# Patient Record
Sex: FEMALE | Race: WHITE | NOT HISPANIC OR LATINO | Employment: OTHER | URBAN - METROPOLITAN AREA
[De-identification: names, ages, dates, MRNs, and addresses within clinical notes are randomized per-mention and may not be internally consistent; named-entity substitution may affect disease eponyms.]

---

## 2017-07-11 ENCOUNTER — OFFICE VISIT (OUTPATIENT)
Dept: OPTOMETRY | Facility: CLINIC | Age: 62
End: 2017-07-11
Payer: MEDICAID

## 2017-07-11 DIAGNOSIS — H52.03 HYPEROPIA WITH PRESBYOPIA, BILATERAL: ICD-10-CM

## 2017-07-11 DIAGNOSIS — E11.9 DIABETES MELLITUS TYPE 2 WITHOUT RETINOPATHY: Primary | ICD-10-CM

## 2017-07-11 DIAGNOSIS — H52.4 HYPEROPIA WITH PRESBYOPIA, BILATERAL: ICD-10-CM

## 2017-07-11 DIAGNOSIS — H25.13 NUCLEAR SCLEROSIS, BILATERAL: ICD-10-CM

## 2017-07-11 DIAGNOSIS — Z13.5 GLAUCOMA SCREENING: ICD-10-CM

## 2017-07-11 PROCEDURE — 99212 OFFICE O/P EST SF 10 MIN: CPT | Mod: PBBFAC,PO | Performed by: OPTOMETRIST

## 2017-07-11 PROCEDURE — 92015 DETERMINE REFRACTIVE STATE: CPT | Mod: ,,, | Performed by: OPTOMETRIST

## 2017-07-11 PROCEDURE — 99999 PR PBB SHADOW E&M-EST. PATIENT-LVL II: CPT | Mod: PBBFAC,,, | Performed by: OPTOMETRIST

## 2017-07-11 PROCEDURE — 92014 COMPRE OPH EXAM EST PT 1/>: CPT | Mod: S$PBB,,, | Performed by: OPTOMETRIST

## 2017-07-11 NOTE — PROGRESS NOTES
HPI     DSL- 7/6/16     Pt states no Va change. Pt  occas has eye dryness/allergies.   Glare is a bother. Pt denies floaters and flashes.   BSL was 221 this morning.   Pt did not last MRX filled.    Eyemeds  Systane OU PRN    No results found for: HGBA1C      Last edited by Aquilino Lau, OD on 7/11/2017 10:50 AM. (History)            Assessment /Plan     For exam results, see Encounter Report.    Diabetes mellitus type 2 without retinopathy  -No retinopathy noted today.  Continued control with primary care physician and annual comprehensive eye exam.    Nuclear sclerosis, bilateral  -Educated patient on presence of cataracts at today's exam, monitor at annual dilated fundus exam. 5+ years surgical estimate.    Glaucoma screening  -Monitor with annual eye exam and IOP check    Hyperopia with presbyopia, bilateral  Eyeglass Final Rx     Eyeglass Final Rx       Sphere Cylinder Axis Dist VA Add    Right +1.50 +0.75 155 20/20- +2.50    Left +1.25 +1.25 180 20/20- +2.50    Type:  PAL    Expiration Date:  7/12/2018                  RTC 1 yr

## 2018-12-19 DIAGNOSIS — Z13.820 SCREENING FOR OSTEOPOROSIS: Primary | ICD-10-CM

## 2019-01-02 ENCOUNTER — HOSPITAL ENCOUNTER (OUTPATIENT)
Dept: RADIOLOGY | Facility: HOSPITAL | Age: 64
Discharge: HOME | End: 2019-01-02
Attending: FAMILY MEDICINE
Payer: MEDICAID

## 2019-01-02 DIAGNOSIS — Z13.820 SCREENING FOR OSTEOPOROSIS: ICD-10-CM

## 2019-01-02 PROCEDURE — 77080 DXA BONE DENSITY AXIAL: CPT | Mod: 26,,, | Performed by: RADIOLOGY

## 2019-01-02 PROCEDURE — 77080 DEXA BONE DENSITY SPINE HIP: ICD-10-PCS | Mod: 26,,, | Performed by: RADIOLOGY

## 2019-01-02 PROCEDURE — 77080 DXA BONE DENSITY AXIAL: CPT | Mod: TC

## 2019-01-10 DIAGNOSIS — R74.8 ABNORMAL LEVELS OF OTHER SERUM ENZYMES: Primary | ICD-10-CM

## 2019-01-21 ENCOUNTER — HOSPITAL ENCOUNTER (OUTPATIENT)
Dept: RADIOLOGY | Facility: HOSPITAL | Age: 64
Discharge: HOME | End: 2019-01-21
Payer: MEDICAID

## 2019-01-21 DIAGNOSIS — R74.8 ABNORMAL LEVELS OF OTHER SERUM ENZYMES: ICD-10-CM

## 2019-01-21 PROCEDURE — 76705 ECHO EXAM OF ABDOMEN: CPT | Mod: 26,,, | Performed by: RADIOLOGY

## 2019-01-21 PROCEDURE — 76705 US ABDOMEN LIMITED: ICD-10-PCS | Mod: 26,,, | Performed by: RADIOLOGY

## 2019-01-21 PROCEDURE — 76705 ECHO EXAM OF ABDOMEN: CPT | Mod: TC

## 2019-02-27 ENCOUNTER — LAB VISIT (OUTPATIENT)
Dept: LAB | Facility: HOSPITAL | Age: 64
End: 2019-02-27
Attending: FAMILY MEDICINE
Payer: MEDICAID

## 2019-02-27 DIAGNOSIS — R74.8 ACID PHOSPHATASE ELEVATED: Primary | ICD-10-CM

## 2019-02-27 PROCEDURE — 87902 NFCT AGT GNTYP ALYS HEP C: CPT

## 2019-02-27 PROCEDURE — 87522 HEPATITIS C REVRS TRNSCRPJ: CPT

## 2019-02-27 PROCEDURE — 80177 DRUG SCRN QUAN LEVETIRACETAM: CPT

## 2019-02-27 PROCEDURE — 36415 COLL VENOUS BLD VENIPUNCTURE: CPT

## 2019-02-28 LAB — LEVETIRACETAM SERPL-MCNC: 26.3 UG/ML (ref 3–60)

## 2019-03-01 LAB
HCV GENTYP SERPL NAA+PROBE: ABNORMAL
HCV RNA SERPL NAA+PROBE-LOG IU: 6.41 LOG (10) IU/ML
HCV RNA SERPL QL NAA+PROBE: DETECTED
HCV RNA SPEC NAA+PROBE-ACNC: ABNORMAL IU/ML

## 2019-03-06 DIAGNOSIS — Z12.39 SCREENING BREAST EXAMINATION: Primary | ICD-10-CM

## 2019-03-14 ENCOUNTER — HOSPITAL ENCOUNTER (OUTPATIENT)
Dept: RADIOLOGY | Facility: HOSPITAL | Age: 64
Discharge: HOME | End: 2019-03-14
Payer: MEDICAID

## 2019-03-14 VITALS — HEIGHT: 59 IN | WEIGHT: 124 LBS | BODY MASS INDEX: 25 KG/M2

## 2019-03-14 DIAGNOSIS — Z12.39 SCREENING BREAST EXAMINATION: ICD-10-CM

## 2019-03-14 PROCEDURE — 77067 MAMMO DIGITAL SCREENING BILAT WITH TOMOSYNTHESIS_CAD: ICD-10-PCS | Mod: 26,,, | Performed by: RADIOLOGY

## 2019-03-14 PROCEDURE — 77067 SCR MAMMO BI INCL CAD: CPT | Mod: 26,,, | Performed by: RADIOLOGY

## 2019-03-14 PROCEDURE — 77063 BREAST TOMOSYNTHESIS BI: CPT | Mod: 26,,, | Performed by: RADIOLOGY

## 2019-03-14 PROCEDURE — 77063 MAMMO DIGITAL SCREENING BILAT WITH TOMOSYNTHESIS_CAD: ICD-10-PCS | Mod: 26,,, | Performed by: RADIOLOGY

## 2019-03-14 PROCEDURE — 77067 SCR MAMMO BI INCL CAD: CPT | Mod: TC

## 2019-05-27 ENCOUNTER — HOSPITAL ENCOUNTER (INPATIENT)
Facility: HOSPITAL | Age: 64
LOS: 6 days | Discharge: HOME HEALTH | DRG: 432 | End: 2019-06-02
Attending: EMERGENCY MEDICINE | Admitting: EMERGENCY MEDICINE
Payer: MEDICAID

## 2019-05-27 DIAGNOSIS — K92.2 ACUTE UPPER GI BLEEDING: ICD-10-CM

## 2019-05-27 DIAGNOSIS — M79.89 LEG SWELLING: ICD-10-CM

## 2019-05-27 DIAGNOSIS — B19.20 HEPATITIS C VIRUS INFECTION WITHOUT HEPATIC COMA, UNSPECIFIED CHRONICITY: ICD-10-CM

## 2019-05-27 DIAGNOSIS — K92.2 GASTROINTESTINAL HEMORRHAGE, UNSPECIFIED GASTROINTESTINAL HEMORRHAGE TYPE: Primary | ICD-10-CM

## 2019-05-27 DIAGNOSIS — R18.8 OTHER ASCITES: ICD-10-CM

## 2019-05-27 LAB
ABO + RH BLD: NORMAL
ALBUMIN SERPL BCP-MCNC: 2.1 G/DL (ref 3.5–5.2)
ALP SERPL-CCNC: 201 U/L (ref 55–135)
ALT SERPL W/O P-5'-P-CCNC: 13 U/L (ref 10–44)
ANION GAP SERPL CALC-SCNC: 11 MMOL/L (ref 8–16)
ANISOCYTOSIS BLD QL SMEAR: ABNORMAL
APTT BLDCRRT: 33.8 SEC (ref 21–32)
AST SERPL-CCNC: 43 U/L (ref 10–40)
BACTERIA #/AREA URNS HPF: NORMAL /HPF
BASOPHILS # BLD AUTO: 0.01 K/UL (ref 0–0.2)
BASOPHILS NFR BLD: 0.2 % (ref 0–1.9)
BILIRUB SERPL-MCNC: 0.7 MG/DL (ref 0.1–1)
BILIRUB UR QL STRIP: NEGATIVE
BLD GP AB SCN CELLS X3 SERPL QL: NORMAL
BNP SERPL-MCNC: 50 PG/ML (ref 0–99)
BUN SERPL-MCNC: 14 MG/DL (ref 8–23)
CALCIUM SERPL-MCNC: 7.5 MG/DL (ref 8.7–10.5)
CHLORIDE SERPL-SCNC: 111 MMOL/L (ref 95–110)
CLARITY UR: CLEAR
CO2 SERPL-SCNC: 19 MMOL/L (ref 23–29)
COLOR UR: YELLOW
CREAT SERPL-MCNC: 0.9 MG/DL (ref 0.5–1.4)
DACRYOCYTES BLD QL SMEAR: ABNORMAL
DIFFERENTIAL METHOD: ABNORMAL
EOSINOPHIL # BLD AUTO: 0 K/UL (ref 0–0.5)
EOSINOPHIL NFR BLD: 0.6 % (ref 0–8)
ERYTHROCYTE [DISTWIDTH] IN BLOOD BY AUTOMATED COUNT: 15.3 % (ref 11.5–14.5)
EST. GFR  (AFRICAN AMERICAN): >60 ML/MIN/1.73 M^2
EST. GFR  (NON AFRICAN AMERICAN): >60 ML/MIN/1.73 M^2
GLUCOSE SERPL-MCNC: 121 MG/DL (ref 70–110)
GLUCOSE UR QL STRIP: NEGATIVE
HCT VFR BLD AUTO: 19.8 % (ref 37–48.5)
HGB BLD-MCNC: 5.8 G/DL (ref 12–16)
HGB UR QL STRIP: NEGATIVE
HYPOCHROMIA BLD QL SMEAR: ABNORMAL
INR PPP: 1.2 (ref 0.8–1.2)
KETONES UR QL STRIP: ABNORMAL
LACTATE SERPL-SCNC: 7.7 MMOL/L (ref 0.5–2.2)
LEUKOCYTE ESTERASE UR QL STRIP: ABNORMAL
LIPASE SERPL-CCNC: 43 U/L (ref 4–60)
LYMPHOCYTES # BLD AUTO: 0.6 K/UL (ref 1–4.8)
LYMPHOCYTES NFR BLD: 11.7 % (ref 18–48)
MCH RBC QN AUTO: 28.7 PG (ref 27–31)
MCHC RBC AUTO-ENTMCNC: 29.3 G/DL (ref 32–36)
MCV RBC AUTO: 98 FL (ref 82–98)
MICROSCOPIC COMMENT: NORMAL
MONOCYTES # BLD AUTO: 0.6 K/UL (ref 0.3–1)
MONOCYTES NFR BLD: 12.8 % (ref 4–15)
NEUTROPHILS # BLD AUTO: 3.5 K/UL (ref 1.8–7.7)
NEUTROPHILS NFR BLD: 74.9 % (ref 38–73)
NITRITE UR QL STRIP: NEGATIVE
OVALOCYTES BLD QL SMEAR: ABNORMAL
PH UR STRIP: 5 [PH] (ref 5–8)
PLATELET # BLD AUTO: 166 K/UL (ref 150–350)
PLATELET BLD QL SMEAR: ABNORMAL
PMV BLD AUTO: 10.5 FL (ref 9.2–12.9)
POLYCHROMASIA BLD QL SMEAR: ABNORMAL
POTASSIUM SERPL-SCNC: 4.4 MMOL/L (ref 3.5–5.1)
PROT SERPL-MCNC: 5.3 G/DL (ref 6–8.4)
PROT UR QL STRIP: NEGATIVE
PROTHROMBIN TIME: 13 SEC (ref 9–12.5)
RBC # BLD AUTO: 2.02 M/UL (ref 4–5.4)
SMUDGE CELLS BLD QL SMEAR: PRESENT
SODIUM SERPL-SCNC: 141 MMOL/L (ref 136–145)
SP GR UR STRIP: 1.02 (ref 1–1.03)
TROPONIN I SERPL DL<=0.01 NG/ML-MCNC: <0.006 NG/ML (ref 0–0.03)
URN SPEC COLLECT METH UR: ABNORMAL
UROBILINOGEN UR STRIP-ACNC: NEGATIVE EU/DL
WBC # BLD AUTO: 4.7 K/UL (ref 3.9–12.7)
WBC #/AREA URNS HPF: 4 /HPF (ref 0–5)

## 2019-05-27 PROCEDURE — 20000000 HC ICU ROOM

## 2019-05-27 PROCEDURE — 25000003 PHARM REV CODE 250: Performed by: EMERGENCY MEDICINE

## 2019-05-27 PROCEDURE — 85610 PROTHROMBIN TIME: CPT

## 2019-05-27 PROCEDURE — 83880 ASSAY OF NATRIURETIC PEPTIDE: CPT

## 2019-05-27 PROCEDURE — 96366 THER/PROPH/DIAG IV INF ADDON: CPT

## 2019-05-27 PROCEDURE — 86920 COMPATIBILITY TEST SPIN: CPT

## 2019-05-27 PROCEDURE — 99285 EMERGENCY DEPT VISIT HI MDM: CPT | Mod: 25

## 2019-05-27 PROCEDURE — 87040 BLOOD CULTURE FOR BACTERIA: CPT | Mod: 59

## 2019-05-27 PROCEDURE — C9113 INJ PANTOPRAZOLE SODIUM, VIA: HCPCS | Performed by: EMERGENCY MEDICINE

## 2019-05-27 PROCEDURE — 96361 HYDRATE IV INFUSION ADD-ON: CPT

## 2019-05-27 PROCEDURE — 96375 TX/PRO/DX INJ NEW DRUG ADDON: CPT

## 2019-05-27 PROCEDURE — 80074 ACUTE HEPATITIS PANEL: CPT

## 2019-05-27 PROCEDURE — P9016 RBC LEUKOCYTES REDUCED: HCPCS

## 2019-05-27 PROCEDURE — 25000003 PHARM REV CODE 250: Performed by: NURSE PRACTITIONER

## 2019-05-27 PROCEDURE — 63600175 PHARM REV CODE 636 W HCPCS: Performed by: EMERGENCY MEDICINE

## 2019-05-27 PROCEDURE — 80053 COMPREHEN METABOLIC PANEL: CPT

## 2019-05-27 PROCEDURE — 96365 THER/PROPH/DIAG IV INF INIT: CPT

## 2019-05-27 PROCEDURE — 12000002 HC ACUTE/MED SURGE SEMI-PRIVATE ROOM

## 2019-05-27 PROCEDURE — 80329 ANALGESICS NON-OPIOID 1 OR 2: CPT

## 2019-05-27 PROCEDURE — 85025 COMPLETE CBC W/AUTO DIFF WBC: CPT

## 2019-05-27 PROCEDURE — 84484 ASSAY OF TROPONIN QUANT: CPT

## 2019-05-27 PROCEDURE — 81000 URINALYSIS NONAUTO W/SCOPE: CPT

## 2019-05-27 PROCEDURE — 83605 ASSAY OF LACTIC ACID: CPT

## 2019-05-27 PROCEDURE — 36430 TRANSFUSION BLD/BLD COMPNT: CPT

## 2019-05-27 PROCEDURE — 86850 RBC ANTIBODY SCREEN: CPT

## 2019-05-27 PROCEDURE — 85730 THROMBOPLASTIN TIME PARTIAL: CPT

## 2019-05-27 PROCEDURE — 83690 ASSAY OF LIPASE: CPT

## 2019-05-27 RX ORDER — ONDANSETRON 2 MG/ML
4 INJECTION INTRAMUSCULAR; INTRAVENOUS EVERY 8 HOURS PRN
Status: DISCONTINUED | OUTPATIENT
Start: 2019-05-27 | End: 2019-05-28

## 2019-05-27 RX ORDER — LIDOCAINE HYDROCHLORIDE 10 MG/ML
5 INJECTION INFILTRATION; PERINEURAL
Status: DISCONTINUED | OUTPATIENT
Start: 2019-05-27 | End: 2019-05-27

## 2019-05-27 RX ORDER — METOCLOPRAMIDE HYDROCHLORIDE 5 MG/ML
5 INJECTION INTRAMUSCULAR; INTRAVENOUS
Status: COMPLETED | OUTPATIENT
Start: 2019-05-27 | End: 2019-05-27

## 2019-05-27 RX ORDER — SODIUM CHLORIDE 0.9 % (FLUSH) 0.9 %
10 SYRINGE (ML) INJECTION
Status: DISCONTINUED | OUTPATIENT
Start: 2019-05-27 | End: 2019-06-02 | Stop reason: HOSPADM

## 2019-05-27 RX ORDER — PANTOPRAZOLE SODIUM 40 MG/10ML
80 INJECTION, POWDER, LYOPHILIZED, FOR SOLUTION INTRAVENOUS
Status: COMPLETED | OUTPATIENT
Start: 2019-05-27 | End: 2019-05-27

## 2019-05-27 RX ORDER — HYDROCODONE BITARTRATE AND ACETAMINOPHEN 500; 5 MG/1; MG/1
TABLET ORAL
Status: DISCONTINUED | OUTPATIENT
Start: 2019-05-27 | End: 2019-06-02 | Stop reason: HOSPADM

## 2019-05-27 RX ORDER — ASPIRIN 81 MG/1
81 TABLET ORAL DAILY
Status: ON HOLD | COMMUNITY
End: 2019-05-28

## 2019-05-27 RX ORDER — LOSARTAN POTASSIUM 25 MG/1
25 TABLET ORAL DAILY
COMMUNITY

## 2019-05-27 RX ADMIN — OCTREOTIDE ACETATE 50 MCG/HR: 500 INJECTION, SOLUTION INTRAVENOUS; SUBCUTANEOUS at 08:05

## 2019-05-27 RX ADMIN — SODIUM CHLORIDE 1000 ML: 0.9 INJECTION, SOLUTION INTRAVENOUS at 05:05

## 2019-05-27 RX ADMIN — PANTOPRAZOLE SODIUM 8 MG/HR: 40 INJECTION, POWDER, FOR SOLUTION INTRAVENOUS at 06:05

## 2019-05-27 RX ADMIN — SODIUM CHLORIDE 500 ML: 0.9 INJECTION, SOLUTION INTRAVENOUS at 06:05

## 2019-05-27 RX ADMIN — PANTOPRAZOLE SODIUM 80 MG: 40 INJECTION, POWDER, FOR SOLUTION INTRAVENOUS at 05:05

## 2019-05-27 RX ADMIN — METOCLOPRAMIDE 5 MG: 5 INJECTION, SOLUTION INTRAMUSCULAR; INTRAVENOUS at 08:05

## 2019-05-27 NOTE — ED TRIAGE NOTES
"Pt arrived to Ed via EMS due to reported rectal bleeding that strted today. Reports stool was noted to be dark after taking pepto bismol. Reports having abdominal swelling for past week."They said I may have hepatitis."   "

## 2019-05-27 NOTE — ED PROVIDER NOTES
Encounter Date: 5/27/2019    SCRIBE #1 NOTE: I, Adelaida Morales, am scribing for, and in the presence of,  Marlin Lopez MD. I have scribed the following portions of the note - Other sections scribed: HPI, ROS.       History     Chief Complaint   Patient presents with    Rectal Bleeding     Reports having diarrhea with  noted  dark tarry stools about 1 hour ago. States taking pepto bismol around 1130 today. Swelling to belling reported, and weakness     CC: Rectal Bleeding    HPI: This 64 y/o female with a medical history of hernia, DM, hepatitis-C, and HTN presents to the ED via EMS, accompanied by relative, for evaluation of x1 episode of bright red rectal bleeding and dark black stool with bowel movment and abdominal pain that began today. Patient c/o associated abdominal distention for x1 week. Patient reports rectal bleeding began x2 hours after taking Pepto Bismol for diarrhea. Relative notes patient was experiencing difficulties urinating last week and was taking Penicillin before their insurance was suspended, symptoms have since resolved. Patient's most recent colonoscopy was x7 years ago, with no findings. Patient has no history of kidney problems or stomach ulcers. Patient denies chills, diaphoresis, congestion, cough, or rhinorrhea. No alleviating or exacerbating factors reported.  Patient reports she was being worked up for liver problems in January, she states that she had an ultrasound that showed a gallstone.  After her initial evaluation in January, she lost her insurance and has been lost to follow-up since then.  She denies previous history of any liver problems.  She takes a baby aspirin daily but denies use of other blood thinners.    The history is provided by the patient and a relative. No  was used.     Review of patient's allergies indicates:  No Known Allergies  Past Medical History:   Diagnosis Date    Diabetes mellitus     Hypertension      Past Surgical History:    Procedure Laterality Date    BREAST CYST EXCISION       Family History   Problem Relation Age of Onset    Cataracts Mother     Glaucoma Mother     No Known Problems Father     No Known Problems Sister     No Known Problems Brother     No Known Problems Maternal Aunt     No Known Problems Maternal Uncle     No Known Problems Paternal Aunt     No Known Problems Paternal Uncle     No Known Problems Maternal Grandmother     No Known Problems Maternal Grandfather     No Known Problems Paternal Grandmother     No Known Problems Paternal Grandfather     Amblyopia Neg Hx     Blindness Neg Hx     Cancer Neg Hx     Diabetes Neg Hx     Hypertension Neg Hx     Macular degeneration Neg Hx     Retinal detachment Neg Hx     Strabismus Neg Hx     Stroke Neg Hx     Thyroid disease Neg Hx      Social History     Tobacco Use    Smoking status: Former Smoker   Substance Use Topics    Alcohol use: No    Drug use: Not on file     Review of Systems   Constitutional: Positive for fever (Reports intermittent subjective fever). Negative for appetite change and chills.   HENT: Negative for rhinorrhea and sore throat.    Eyes: Negative for visual disturbance.   Respiratory: Negative for cough and shortness of breath.    Cardiovascular: Negative for chest pain.   Gastrointestinal: Positive for abdominal distention, abdominal pain and blood in stool. Negative for vomiting.   Genitourinary: Negative for dysuria.   Musculoskeletal: Negative for gait problem.   Skin: Negative for rash.   Neurological: Negative for syncope.       Physical Exam     Initial Vitals [05/27/19 1614]   BP Pulse Resp Temp SpO2   (!) 93/50 105 18 98.7 °F (37.1 °C) 96 %      MAP       --         Physical Exam    Constitutional: She is not diaphoretic. No distress.   Appears older than stated age, pale, no apparent distress   HENT:   Mouth/Throat: Oropharynx is clear and moist.   Eyes: Pupils are equal, round, and reactive to light. No scleral  icterus.   Pale conjunctiva   Neck: Neck supple.   Cardiovascular: Regular rhythm. Tachycardia present.    Pulses:       Dorsalis pedis pulses are 2+ on the right side, and 2+ on the left side.   Pulmonary/Chest: Breath sounds normal. No respiratory distress.   Abdominal: Soft. Bowel sounds are normal. She exhibits distension and fluid wave. There is generalized tenderness. There is no rigidity and no guarding.   Well-healed midline surgical scar of the abdomen   Genitourinary: Rectal exam shows external hemorrhoid (Multiple non inflamed) and guaiac positive stool. Rectal exam shows no tenderness and anal tone normal. Guaiac positive stool. : Acceptable.Pelvic exam was performed with patient in the knee-chest position.   Genitourinary Comments: For ТАТЬЯНА with yumiko melena, RN present for rectal exam as chaperone.   Musculoskeletal:   1+ pitting edema to the left lower extremity, 2+ pitting edema noted to the right lower extremity.   Neurological: She is alert and oriented to person, place, and time. GCS score is 15. GCS eye subscore is 4. GCS verbal subscore is 5. GCS motor subscore is 6.   Skin: Skin is warm and dry. There is pallor.   Psychiatric: She has a normal mood and affect.         ED Course   Critical Care  Date/Time: 6/4/2019 10:02 PM  Performed by: Marlin Lopez MD  Authorized by: Tuan Barnes MD   Total critical care time (exclusive of procedural time) : 0 minutes  Comments: Please put in 40 minutes of critical care due to patient having a high risk of circulatory  failure.   Separate from teaching and exclusive of procedure and ekg time  Includes:  Time at bedside  Time reviewing test results  Time discussing case with staff  Time documenting the medical record  Time spent with family members  Time spent with consults  Management          Labs Reviewed   CBC W/ AUTO DIFFERENTIAL - Abnormal; Notable for the following components:       Result Value    RBC 2.02 (*)     Hemoglobin  5.8 (*)     Hematocrit 19.8 (*)     Mean Corpuscular Hemoglobin Conc 29.3 (*)     RDW 15.3 (*)     Lymph # 0.6 (*)     Gran% 74.9 (*)     Lymph% 11.7 (*)     All other components within normal limits    Narrative:        Hemoglobin/Hematocrit critical result(s) called and verbal   readback obtained from Carmen curran at 18:55 on 05/27/2019,   05/27/2019 19:07   COMPREHENSIVE METABOLIC PANEL - Abnormal; Notable for the following components:    Chloride 111 (*)     CO2 19 (*)     Glucose 121 (*)     Calcium 7.5 (*)     Total Protein 5.3 (*)     Albumin 2.1 (*)     Alkaline Phosphatase 201 (*)     AST 43 (*)     All other components within normal limits   URINALYSIS, REFLEX TO URINE CULTURE - Abnormal; Notable for the following components:    Ketones, UA Trace (*)     Leukocytes, UA Trace (*)     All other components within normal limits   PROTIME-INR - Abnormal; Notable for the following components:    Prothrombin Time 13.0 (*)     All other components within normal limits   APTT - Abnormal; Notable for the following components:    aPTT 33.8 (*)     All other components within normal limits   LACTIC ACID, PLASMA - Abnormal; Notable for the following components:    Lactate (Lactic Acid) 7.7 (*)     All other components within normal limits    Narrative:     LACTIC ACID   critical result(s) called and verbal readback obtained   from VERO CURRAN., 05/27/2019 18:55   CULTURE, BLOOD   CULTURE, BLOOD   CULTURE, BLOOD   CULTURE, BLOOD   B-TYPE NATRIURETIC PEPTIDE   TROPONIN I   LIPASE   URINALYSIS MICROSCOPIC   HEPATITIS PANEL, ACUTE   ACETAMINOPHEN LEVEL   POCT OCCULT BLOOD STOOL   TYPE & SCREEN   CYTOLOGY SPECIMEN- MEDICAL CYTOLOGY (FLUID/WASH/BRUSH)   PREPARE RBC SOFT     EKG Readings: (Independently Interpreted)   Sinus tachycardia, rate 108 beats per minute, no ST elevation.  No STEMI.  Normal NE interval.   milliseconds.       Imaging Results          US Lower Extremity Veins Bilateral (Final result)   Result time 05/27/19 19:02:10    Final result by Laura Villatoro MD (05/27/19 19:02:10)                 Impression:      No evidence of lower extremity deep venous thrombosis.      Electronically signed by: Laura Villatoro MD  Date:    05/27/2019  Time:    19:02             Narrative:    EXAMINATION:  US LOWER EXTREMITY VEINS BILATERAL    CLINICAL HISTORY:  Other specified soft tissue disorders    TECHNIQUE:  Duplex and color flow Doppler evaluation of the bilateral lower extremity veins was performed.    COMPARISON:  None    FINDINGS:  No evidence of clot involving the bilateral common femoral, greater saphenous, femoral, popliteal, peroneal, anterior and posterior tibial veins.  All venous structures demonstrate normal respiratory phasicity and augment adequately.  No evidence of soft tissue mass or Baker's cyst.                                 Medical Decision Making:   Initial Assessment:   63-year-old female presenting with both right bright red blood and dark stool.  Chart review shows patient has a history of hepatitis C, diagnosed in February of 2019.  Exam notable for abdominal distention with a fluid wave, pale conjunctiva, pale skin, rectal exam with melena.  Concern for GI bleed and liver dysfunction with new onset ascites.  Also noted is that her right lower extremity is more swollen than the left.  Workup initiated with basic labs include type and cross, 2 large-bore IVs, ultrasound of the lower extremities to assess for DVT.  Will perform bedside ultrasound to assess for ascites.  ED Management:  Case discussed with Dr. Rebolledo- as patient has no rigidity/guarding, no fever or leukocytosis will defer paracentesis to be performed inpatient. I do not suspect SBP given her laboratory results and physical exam.  Lactic acid is very elevated, I suspect this is related to low hemoglobin, relative hypotension in the setting of GI bleed, and liver dysfunction.  Will continue giving Protonix, octreotide, will  order Rocephin, Reglan per GI recs.  Of note, patient does not want family members to know of her hepatitis C diagnosis.  I have reviewed this request with the RN taking care of the patient and will discuss with Dr. Rebolledo.             Scribe Attestation:   Scribe #1: I performed the above scribed service and the documentation accurately describes the services I performed. I attest to the accuracy of the note.    Attending Attestation:           Physician Attestation for Scribe:  Physician Attestation Statement for Scribe #1: I, Marlin Lopez MD, reviewed documentation, as scribed by Adelaida Morales in my presence, and it is both accurate and complete.                 ED Course as of May 27 2205   Mon May 27, 2019   1944 Discussed case with LEAH Caba, who agrees with transfusion of 2 units prbc (goal Hgb 8), he recommends starting patient on octreotide drip in giving a dose of Reglan.  He agrees with giving Rocephin after paracentesis.    [LH]      ED Course User Index  [LH] Marlin Lopez MD     Clinical Impression:       ICD-10-CM ICD-9-CM   1. Gastrointestinal hemorrhage, unspecified gastrointestinal hemorrhage type K92.2 578.9   2. Leg swelling M79.89 729.81   3. Hepatitis C virus infection without hepatic coma, unspecified chronicity B19.20 070.70                                Marlin Lopez MD  05/27/19 2205       Marlin Lopez MD  06/04/19 2202

## 2019-05-28 ENCOUNTER — ANESTHESIA EVENT (OUTPATIENT)
Dept: ENDOSCOPY | Facility: HOSPITAL | Age: 64
DRG: 432 | End: 2019-05-28
Payer: MEDICAID

## 2019-05-28 ENCOUNTER — ANESTHESIA (OUTPATIENT)
Dept: ENDOSCOPY | Facility: HOSPITAL | Age: 64
DRG: 432 | End: 2019-05-28
Payer: MEDICAID

## 2019-05-28 PROBLEM — B19.20 HEPATITIS C: Status: ACTIVE | Noted: 2019-05-28

## 2019-05-28 PROBLEM — D64.9 SYMPTOMATIC ANEMIA: Status: ACTIVE | Noted: 2019-05-28

## 2019-05-28 PROBLEM — K74.60 CIRRHOSIS OF LIVER WITH ASCITES: Status: ACTIVE | Noted: 2019-05-28

## 2019-05-28 PROBLEM — R18.8 CIRRHOSIS OF LIVER WITH ASCITES: Status: ACTIVE | Noted: 2019-05-28

## 2019-05-28 PROBLEM — K92.2 ACUTE UPPER GI BLEEDING: Status: ACTIVE | Noted: 2019-05-28

## 2019-05-28 PROBLEM — R18.8 ASCITES: Status: ACTIVE | Noted: 2019-05-28

## 2019-05-28 LAB
ALBUMIN SERPL BCP-MCNC: 2.5 G/DL (ref 3.5–5.2)
ALP SERPL-CCNC: 178 U/L (ref 55–135)
ALT SERPL W/O P-5'-P-CCNC: 17 U/L (ref 10–44)
AMMONIA PLAS-SCNC: 135 UMOL/L (ref 10–50)
ANION GAP SERPL CALC-SCNC: 9 MMOL/L (ref 8–16)
APAP SERPL-MCNC: <3 UG/ML (ref 10–20)
APPEARANCE FLD: CLEAR
AST SERPL-CCNC: 50 U/L (ref 10–40)
BASOPHILS # BLD AUTO: 0.01 K/UL (ref 0–0.2)
BASOPHILS NFR BLD: 0.2 % (ref 0–1.9)
BILIRUB SERPL-MCNC: 1 MG/DL (ref 0.1–1)
BLD PROD TYP BPU: NORMAL
BLD PROD TYP BPU: NORMAL
BLOOD UNIT EXPIRATION DATE: NORMAL
BLOOD UNIT EXPIRATION DATE: NORMAL
BLOOD UNIT TYPE CODE: 5100
BLOOD UNIT TYPE CODE: 5100
BLOOD UNIT TYPE: NORMAL
BLOOD UNIT TYPE: NORMAL
BODY FLD TYPE: NORMAL
BUN SERPL-MCNC: 19 MG/DL (ref 8–23)
CALCIUM SERPL-MCNC: 7.9 MG/DL (ref 8.7–10.5)
CHLORIDE SERPL-SCNC: 112 MMOL/L (ref 95–110)
CO2 SERPL-SCNC: 21 MMOL/L (ref 23–29)
CODING SYSTEM: NORMAL
CODING SYSTEM: NORMAL
COLOR FLD: COLORLESS
CREAT SERPL-MCNC: 0.9 MG/DL (ref 0.5–1.4)
DIFFERENTIAL METHOD: ABNORMAL
DISPENSE STATUS: NORMAL
DISPENSE STATUS: NORMAL
EOSINOPHIL # BLD AUTO: 0.1 K/UL (ref 0–0.5)
EOSINOPHIL NFR BLD: 1 % (ref 0–8)
ERYTHROCYTE [DISTWIDTH] IN BLOOD BY AUTOMATED COUNT: 15.9 % (ref 11.5–14.5)
EST. GFR  (AFRICAN AMERICAN): >60 ML/MIN/1.73 M^2
EST. GFR  (NON AFRICAN AMERICAN): >60 ML/MIN/1.73 M^2
ESTIMATED AVG GLUCOSE: 134 MG/DL (ref 68–131)
GLUCOSE SERPL-MCNC: 148 MG/DL (ref 70–110)
GRAM STN SPEC: NORMAL
HAV IGM SERPL QL IA: NEGATIVE
HBA1C MFR BLD HPLC: 6.3 % (ref 4–5.6)
HBV CORE IGM SERPL QL IA: NEGATIVE
HBV SURFACE AG SERPL QL IA: NEGATIVE
HCT VFR BLD AUTO: 31 % (ref 37–48.5)
HCT VFR BLD AUTO: 31 % (ref 37–48.5)
HCT VFR BLD AUTO: 31.3 % (ref 37–48.5)
HCV AB SERPL QL IA: POSITIVE
HGB BLD-MCNC: 9.6 G/DL (ref 12–16)
HGB BLD-MCNC: 9.8 G/DL (ref 12–16)
INR PPP: 1.3 (ref 0.8–1.2)
LACTATE SERPL-SCNC: 2.4 MMOL/L (ref 0.5–2.2)
LYMPHOCYTES # BLD AUTO: 1 K/UL (ref 1–4.8)
LYMPHOCYTES NFR BLD: 19.9 % (ref 18–48)
LYMPHOCYTES NFR FLD MANUAL: 21 %
MCH RBC QN AUTO: 28.9 PG (ref 27–31)
MCHC RBC AUTO-ENTMCNC: 31 G/DL (ref 32–36)
MCV RBC AUTO: 93 FL (ref 82–98)
MESOTHL CELL NFR FLD MANUAL: 14 %
MONOCYTES # BLD AUTO: 0.9 K/UL (ref 0.3–1)
MONOCYTES NFR BLD: 18.1 % (ref 4–15)
MONOS+MACROS NFR FLD MANUAL: 62 %
NEUTROPHILS # BLD AUTO: 3 K/UL (ref 1.8–7.7)
NEUTROPHILS NFR BLD: 61.2 % (ref 38–73)
NEUTROPHILS NFR FLD MANUAL: 3 %
NUM UNITS TRANS PACKED RBC: NORMAL
NUM UNITS TRANS PACKED RBC: NORMAL
PLATELET # BLD AUTO: 164 K/UL (ref 150–350)
PMV BLD AUTO: 10.5 FL (ref 9.2–12.9)
POCT GLUCOSE: 151 MG/DL (ref 70–110)
POCT GLUCOSE: 157 MG/DL (ref 70–110)
POCT GLUCOSE: 179 MG/DL (ref 70–110)
POTASSIUM SERPL-SCNC: 5 MMOL/L (ref 3.5–5.1)
PROT SERPL-MCNC: 5.9 G/DL (ref 6–8.4)
PROTHROMBIN TIME: 13.2 SEC (ref 9–12.5)
RBC # BLD AUTO: 3.32 M/UL (ref 4–5.4)
SODIUM SERPL-SCNC: 142 MMOL/L (ref 136–145)
WBC # BLD AUTO: 4.97 K/UL (ref 3.9–12.7)
WBC # FLD: 237 /CU MM

## 2019-05-28 PROCEDURE — D9220A PRA ANESTHESIA: ICD-10-PCS | Mod: ANES,,, | Performed by: ANESTHESIOLOGY

## 2019-05-28 PROCEDURE — C9113 INJ PANTOPRAZOLE SODIUM, VIA: HCPCS | Performed by: HOSPITALIST

## 2019-05-28 PROCEDURE — 25000003 PHARM REV CODE 250: Performed by: HOSPITALIST

## 2019-05-28 PROCEDURE — 83036 HEMOGLOBIN GLYCOSYLATED A1C: CPT

## 2019-05-28 PROCEDURE — 25000242 PHARM REV CODE 250 ALT 637 W/ HCPCS: Performed by: HOSPITALIST

## 2019-05-28 PROCEDURE — 63600175 PHARM REV CODE 636 W HCPCS: Performed by: HOSPITALIST

## 2019-05-28 PROCEDURE — 25000003 PHARM REV CODE 250: Performed by: INTERNAL MEDICINE

## 2019-05-28 PROCEDURE — 37000008 HC ANESTHESIA 1ST 15 MINUTES: Performed by: INTERNAL MEDICINE

## 2019-05-28 PROCEDURE — 63600175 PHARM REV CODE 636 W HCPCS: Performed by: EMERGENCY MEDICINE

## 2019-05-28 PROCEDURE — C9113 INJ PANTOPRAZOLE SODIUM, VIA: HCPCS | Performed by: EMERGENCY MEDICINE

## 2019-05-28 PROCEDURE — 43236 UPPR GI SCOPE W/SUBMUC INJ: CPT | Performed by: INTERNAL MEDICINE

## 2019-05-28 PROCEDURE — 37000009 HC ANESTHESIA EA ADD 15 MINS: Performed by: INTERNAL MEDICINE

## 2019-05-28 PROCEDURE — 85025 COMPLETE CBC W/AUTO DIFF WBC: CPT

## 2019-05-28 PROCEDURE — 83615 LACTATE (LD) (LDH) ENZYME: CPT

## 2019-05-28 PROCEDURE — D9220A PRA ANESTHESIA: Mod: ANES,,, | Performed by: ANESTHESIOLOGY

## 2019-05-28 PROCEDURE — D9220A PRA ANESTHESIA: Mod: CRNA,,, | Performed by: NURSE ANESTHETIST, CERTIFIED REGISTERED

## 2019-05-28 PROCEDURE — 84157 ASSAY OF PROTEIN OTHER: CPT

## 2019-05-28 PROCEDURE — 87205 SMEAR GRAM STAIN: CPT

## 2019-05-28 PROCEDURE — 94761 N-INVAS EAR/PLS OXIMETRY MLT: CPT

## 2019-05-28 PROCEDURE — 82150 ASSAY OF AMYLASE: CPT

## 2019-05-28 PROCEDURE — 82140 ASSAY OF AMMONIA: CPT

## 2019-05-28 PROCEDURE — 49082 ABD PARACENTESIS: CPT | Mod: ,,, | Performed by: NURSE PRACTITIONER

## 2019-05-28 PROCEDURE — 85014 HEMATOCRIT: CPT | Mod: 91

## 2019-05-28 PROCEDURE — 49082 PR ABD PARACENTESIS: ICD-10-PCS | Mod: ,,, | Performed by: NURSE PRACTITIONER

## 2019-05-28 PROCEDURE — D9220A PRA ANESTHESIA: ICD-10-PCS | Mod: CRNA,,, | Performed by: NURSE ANESTHETIST, CERTIFIED REGISTERED

## 2019-05-28 PROCEDURE — 25000003 PHARM REV CODE 250: Performed by: ANESTHESIOLOGY

## 2019-05-28 PROCEDURE — 99291 CRITICAL CARE FIRST HOUR: CPT | Mod: 25,,, | Performed by: EMERGENCY MEDICINE

## 2019-05-28 PROCEDURE — 89051 BODY FLUID CELL COUNT: CPT

## 2019-05-28 PROCEDURE — 27201028 HC NEEDLE, SCLERO: Performed by: INTERNAL MEDICINE

## 2019-05-28 PROCEDURE — 85610 PROTHROMBIN TIME: CPT

## 2019-05-28 PROCEDURE — 94640 AIRWAY INHALATION TREATMENT: CPT

## 2019-05-28 PROCEDURE — 83605 ASSAY OF LACTIC ACID: CPT

## 2019-05-28 PROCEDURE — 85018 HEMOGLOBIN: CPT

## 2019-05-28 PROCEDURE — 25000003 PHARM REV CODE 250: Performed by: EMERGENCY MEDICINE

## 2019-05-28 PROCEDURE — 80053 COMPREHEN METABOLIC PANEL: CPT

## 2019-05-28 PROCEDURE — 99291 PR CRITICAL CARE, E/M 30-74 MINUTES: ICD-10-PCS | Mod: 25,,, | Performed by: EMERGENCY MEDICINE

## 2019-05-28 PROCEDURE — 87070 CULTURE OTHR SPECIMN AEROBIC: CPT

## 2019-05-28 PROCEDURE — 82945 GLUCOSE OTHER FLUID: CPT

## 2019-05-28 PROCEDURE — 82042 OTHER SOURCE ALBUMIN QUAN EA: CPT

## 2019-05-28 PROCEDURE — 63600175 PHARM REV CODE 636 W HCPCS: Performed by: NURSE ANESTHETIST, CERTIFIED REGISTERED

## 2019-05-28 PROCEDURE — 20000000 HC ICU ROOM

## 2019-05-28 PROCEDURE — 25000003 PHARM REV CODE 250: Performed by: NURSE ANESTHETIST, CERTIFIED REGISTERED

## 2019-05-28 PROCEDURE — P9016 RBC LEUKOCYTES REDUCED: HCPCS

## 2019-05-28 PROCEDURE — 87075 CULTR BACTERIA EXCEPT BLOOD: CPT

## 2019-05-28 PROCEDURE — 36415 COLL VENOUS BLD VENIPUNCTURE: CPT

## 2019-05-28 RX ORDER — INSULIN ASPART 100 [IU]/ML
1-10 INJECTION, SOLUTION INTRAVENOUS; SUBCUTANEOUS EVERY 6 HOURS PRN
Status: DISCONTINUED | OUTPATIENT
Start: 2019-05-28 | End: 2019-06-02 | Stop reason: HOSPADM

## 2019-05-28 RX ORDER — SODIUM CHLORIDE 0.9 % (FLUSH) 0.9 %
10 SYRINGE (ML) INJECTION
Status: DISCONTINUED | OUTPATIENT
Start: 2019-05-28 | End: 2019-05-29

## 2019-05-28 RX ORDER — LIDOCAINE HYDROCHLORIDE 20 MG/ML
INJECTION, SOLUTION EPIDURAL; INFILTRATION; INTRACAUDAL; PERINEURAL
Status: DISPENSED
Start: 2019-05-28 | End: 2019-05-29

## 2019-05-28 RX ORDER — PROPOFOL 10 MG/ML
VIAL (ML) INTRAVENOUS
Status: DISCONTINUED | OUTPATIENT
Start: 2019-05-28 | End: 2019-05-28

## 2019-05-28 RX ORDER — SODIUM CHLORIDE 9 MG/ML
INJECTION, SOLUTION INTRAVENOUS CONTINUOUS
Status: DISCONTINUED | OUTPATIENT
Start: 2019-05-28 | End: 2019-05-29

## 2019-05-28 RX ORDER — ONDANSETRON 2 MG/ML
INJECTION INTRAMUSCULAR; INTRAVENOUS
Status: COMPLETED
Start: 2019-05-28 | End: 2019-05-28

## 2019-05-28 RX ORDER — ONDANSETRON 2 MG/ML
8 INJECTION INTRAMUSCULAR; INTRAVENOUS EVERY 8 HOURS PRN
Status: DISCONTINUED | OUTPATIENT
Start: 2019-05-28 | End: 2019-06-02 | Stop reason: HOSPADM

## 2019-05-28 RX ORDER — LIDOCAINE HYDROCHLORIDE 10 MG/ML
5 INJECTION INFILTRATION; PERINEURAL ONCE
Status: DISCONTINUED | OUTPATIENT
Start: 2019-05-28 | End: 2019-06-02 | Stop reason: HOSPADM

## 2019-05-28 RX ORDER — FENTANYL CITRATE 50 UG/ML
INJECTION, SOLUTION INTRAMUSCULAR; INTRAVENOUS
Status: COMPLETED
Start: 2019-05-28 | End: 2019-05-28

## 2019-05-28 RX ORDER — SUCCINYLCHOLINE CHLORIDE 20 MG/ML
INJECTION INTRAMUSCULAR; INTRAVENOUS
Status: COMPLETED
Start: 2019-05-28 | End: 2019-05-28

## 2019-05-28 RX ORDER — CARBIDOPA AND LEVODOPA 25; 100 MG/1; MG/1
2 TABLET, EXTENDED RELEASE ORAL NIGHTLY
Status: DISCONTINUED | OUTPATIENT
Start: 2019-05-28 | End: 2019-06-02 | Stop reason: HOSPADM

## 2019-05-28 RX ORDER — FENTANYL CITRATE 50 UG/ML
INJECTION, SOLUTION INTRAMUSCULAR; INTRAVENOUS
Status: DISCONTINUED | OUTPATIENT
Start: 2019-05-28 | End: 2019-05-28

## 2019-05-28 RX ORDER — GLYCOPYRROLATE 0.2 MG/ML
INJECTION INTRAMUSCULAR; INTRAVENOUS
Status: DISCONTINUED | OUTPATIENT
Start: 2019-05-28 | End: 2019-05-28

## 2019-05-28 RX ORDER — ONDANSETRON 2 MG/ML
INJECTION INTRAMUSCULAR; INTRAVENOUS
Status: DISCONTINUED | OUTPATIENT
Start: 2019-05-28 | End: 2019-05-28

## 2019-05-28 RX ORDER — GLYCOPYRROLATE 0.2 MG/ML
INJECTION INTRAMUSCULAR; INTRAVENOUS
Status: COMPLETED
Start: 2019-05-28 | End: 2019-05-28

## 2019-05-28 RX ORDER — AMITRIPTYLINE HYDROCHLORIDE 25 MG/1
25 TABLET, FILM COATED ORAL NIGHTLY
Status: DISCONTINUED | OUTPATIENT
Start: 2019-05-28 | End: 2019-06-02 | Stop reason: HOSPADM

## 2019-05-28 RX ORDER — PROPOFOL 10 MG/ML
VIAL (ML) INTRAVENOUS
Status: COMPLETED
Start: 2019-05-28 | End: 2019-05-28

## 2019-05-28 RX ORDER — MORPHINE SULFATE 10 MG/ML
4 INJECTION INTRAMUSCULAR; INTRAVENOUS; SUBCUTANEOUS EVERY 6 HOURS PRN
Status: DISCONTINUED | OUTPATIENT
Start: 2019-05-28 | End: 2019-05-29

## 2019-05-28 RX ORDER — EPINEPHRINE 0.1 MG/ML
INJECTION INTRAVENOUS
Status: DISPENSED
Start: 2019-05-28 | End: 2019-05-29

## 2019-05-28 RX ORDER — LACTULOSE 10 G/15ML
30 SOLUTION ORAL 3 TIMES DAILY
Status: DISCONTINUED | OUTPATIENT
Start: 2019-05-28 | End: 2019-05-30

## 2019-05-28 RX ORDER — GLUCAGON 1 MG
1 KIT INJECTION
Status: DISCONTINUED | OUTPATIENT
Start: 2019-05-28 | End: 2019-06-02 | Stop reason: HOSPADM

## 2019-05-28 RX ORDER — ESMOLOL HYDROCHLORIDE 10 MG/ML
INJECTION INTRAVENOUS
Status: DISPENSED
Start: 2019-05-28 | End: 2019-05-29

## 2019-05-28 RX ORDER — ESMOLOL HYDROCHLORIDE 10 MG/ML
INJECTION INTRAVENOUS
Status: DISCONTINUED | OUTPATIENT
Start: 2019-05-28 | End: 2019-05-28

## 2019-05-28 RX ORDER — FLUTICASONE FUROATE AND VILANTEROL 200; 25 UG/1; UG/1
1 POWDER RESPIRATORY (INHALATION) DAILY
Status: DISCONTINUED | OUTPATIENT
Start: 2019-05-28 | End: 2019-06-02 | Stop reason: HOSPADM

## 2019-05-28 RX ORDER — SUCCINYLCHOLINE CHLORIDE 20 MG/ML
INJECTION INTRAMUSCULAR; INTRAVENOUS
Status: DISCONTINUED | OUTPATIENT
Start: 2019-05-28 | End: 2019-05-28

## 2019-05-28 RX ORDER — LEVETIRACETAM 500 MG/1
500 TABLET ORAL 2 TIMES DAILY
Status: DISCONTINUED | OUTPATIENT
Start: 2019-05-28 | End: 2019-06-02 | Stop reason: HOSPADM

## 2019-05-28 RX ORDER — LIDOCAINE HCL/PF 100 MG/5ML
SYRINGE (ML) INTRAVENOUS
Status: DISCONTINUED | OUTPATIENT
Start: 2019-05-28 | End: 2019-05-28

## 2019-05-28 RX ADMIN — FLUTICASONE FUROATE AND VILANTEROL TRIFENATATE 1 PUFF: 200; 25 POWDER RESPIRATORY (INHALATION) at 08:05

## 2019-05-28 RX ADMIN — SODIUM CHLORIDE: 0.9 INJECTION, SOLUTION INTRAVENOUS at 01:05

## 2019-05-28 RX ADMIN — PANTOPRAZOLE SODIUM 8 MG/HR: 40 INJECTION, POWDER, FOR SOLUTION INTRAVENOUS at 07:05

## 2019-05-28 RX ADMIN — MORPHINE SULFATE 4 MG: 10 INJECTION INTRAVENOUS at 07:05

## 2019-05-28 RX ADMIN — ONDANSETRON 4 MG: 2 INJECTION INTRAMUSCULAR; INTRAVENOUS at 12:05

## 2019-05-28 RX ADMIN — PANTOPRAZOLE SODIUM 8 MG/HR: 40 INJECTION, POWDER, FOR SOLUTION INTRAVENOUS at 12:05

## 2019-05-28 RX ADMIN — LACTULOSE 30 G: 20 SOLUTION ORAL at 02:05

## 2019-05-28 RX ADMIN — FENTANYL CITRATE 50 MCG: 50 INJECTION INTRAMUSCULAR; INTRAVENOUS at 01:05

## 2019-05-28 RX ADMIN — AMITRIPTYLINE HYDROCHLORIDE 25 MG: 25 TABLET, FILM COATED ORAL at 08:05

## 2019-05-28 RX ADMIN — ONDANSETRON 4 MG: 2 INJECTION, SOLUTION INTRAMUSCULAR; INTRAVENOUS at 01:05

## 2019-05-28 RX ADMIN — INSULIN ASPART 2 UNITS: 100 INJECTION, SOLUTION INTRAVENOUS; SUBCUTANEOUS at 06:05

## 2019-05-28 RX ADMIN — CARBIDOPA AND LEVODOPA 2 TABLET: 25; 100 TABLET, EXTENDED RELEASE ORAL at 08:05

## 2019-05-28 RX ADMIN — ESMOLOL HYDROCHLORIDE 20 MG: 10 INJECTION, SOLUTION INTRAVENOUS at 01:05

## 2019-05-28 RX ADMIN — LACTULOSE 30 G: 20 SOLUTION ORAL at 08:05

## 2019-05-28 RX ADMIN — CEFTRIAXONE 1 G: 1 INJECTION, SOLUTION INTRAVENOUS at 12:05

## 2019-05-28 RX ADMIN — SUCCINYLCHOLINE CHLORIDE 80 MG: 20 INJECTION, SOLUTION INTRAMUSCULAR; INTRAVENOUS at 01:05

## 2019-05-28 RX ADMIN — OCTREOTIDE ACETATE 50 MCG/HR: 500 INJECTION, SOLUTION INTRAVENOUS; SUBCUTANEOUS at 06:05

## 2019-05-28 RX ADMIN — ONDANSETRON 8 MG: 2 INJECTION INTRAMUSCULAR; INTRAVENOUS at 08:05

## 2019-05-28 RX ADMIN — MORPHINE SULFATE 4 MG: 10 INJECTION INTRAVENOUS at 12:05

## 2019-05-28 RX ADMIN — GLYCOPYRROLATE 0.1 MG: 0.2 INJECTION, SOLUTION INTRAMUSCULAR; INTRAVENOUS at 01:05

## 2019-05-28 RX ADMIN — OCTREOTIDE ACETATE 50 MCG/HR: 500 INJECTION, SOLUTION INTRAVENOUS; SUBCUTANEOUS at 05:05

## 2019-05-28 RX ADMIN — PANTOPRAZOLE SODIUM 8 MG/HR: 40 INJECTION, POWDER, FOR SOLUTION INTRAVENOUS at 05:05

## 2019-05-28 RX ADMIN — LEVETIRACETAM 500 MG: 500 TABLET ORAL at 08:05

## 2019-05-28 RX ADMIN — INSULIN ASPART 1 UNITS: 100 INJECTION, SOLUTION INTRAVENOUS; SUBCUTANEOUS at 11:05

## 2019-05-28 RX ADMIN — PANTOPRAZOLE SODIUM 8 MG/HR: 40 INJECTION, POWDER, FOR SOLUTION INTRAVENOUS at 10:05

## 2019-05-28 RX ADMIN — PROPOFOL 100 MG: 10 INJECTION, EMULSION INTRAVENOUS at 01:05

## 2019-05-28 RX ADMIN — LIDOCAINE HYDROCHLORIDE 20 MG: 20 INJECTION, SOLUTION INTRAVENOUS at 01:05

## 2019-05-28 RX ADMIN — CEFTRIAXONE 1 G: 1 INJECTION, SOLUTION INTRAVENOUS at 11:05

## 2019-05-28 RX ADMIN — INSULIN ASPART 2 UNITS: 100 INJECTION, SOLUTION INTRAVENOUS; SUBCUTANEOUS at 05:05

## 2019-05-28 NOTE — ANESTHESIA POSTPROCEDURE EVALUATION
Anesthesia Post Evaluation    Patient: Jeanie Reardonr    Procedure(s) Performed: Procedure(s) (LRB):  EGD (ESOPHAGOGASTRODUODENOSCOPY) (Left)    Final Anesthesia Type: general  Patient location during evaluation: ICU  Patient participation: Yes- Able to Participate  Level of consciousness: awake  Post-procedure vital signs: reviewed and stable  Pain management: adequate  Airway patency: patent  PONV status at discharge: No PONV  Anesthetic complications: no      Cardiovascular status: hemodynamically stable  Respiratory status: unassisted and spontaneous ventilation  Hydration status: euvolemic  Follow-up not needed.          Vitals Value Taken Time   /60 5/28/2019  2:19 PM   Temp 36.5 °C (97.7 °F) 5/28/2019  2:10 PM   Pulse 105 5/28/2019  2:19 PM   Resp 18 5/28/2019  2:19 PM   SpO2 97 % 5/28/2019  2:19 PM   Vitals shown include unvalidated device data.      No case tracking events are documented in the log.      Pain/Ruperto Score: Pain Rating Prior to Med Admin: 8 (5/28/2019  7:09 AM)  Pain Rating Post Med Admin: 5 (5/28/2019  7:39 AM)

## 2019-05-28 NOTE — PROGRESS NOTES
1410 Patient returned from endoscopy drowsy but easily aroused with 100%NRB mask in place. O2 sats 100%. Patient removed mask and O2 sats remained %. Denies pain at this time.    1500 Patient had 2 juices and some water. Small amount of emesis with complaints of nausea. Cool cloth applied.

## 2019-05-28 NOTE — CONSULTS
"Chief Complaint:  "I bled."    HPI:  The patient is a 63 year old woman with a history of HTN, DM, GERD, and recently diagnosed hepatitis C presenting with a GI bleed.  The patient was diagnosed with Hepatitis C in February of 2019, however, she lost insurance and she was unable to get this worked up further.  Yesterday, she had an episode of a GI bleed, which consisted of dark stool and bright red stool, although she also took peptobismal.  She reports having genearalized abdominal tenderness and a nine lb weight loss, but no nausea, emesis, diarrhea, or constipation. She has not had any further episodes of bleeding after the one episode.  The patient denies taking NSAIDs or using anticoagulation. She reports having a normal colonoscopy 7 years ago.    Past Medical History:   Diagnosis Date    Diabetes mellitus     GERD (gastroesophageal reflux disease)     Hypertension      Past Surgical History:   Procedure Laterality Date    ABDOMINAL SURGERY      BREAST CYST EXCISION       Family History   Problem Relation Age of Onset    Cataracts Mother     Glaucoma Mother     No Known Problems Father     No Known Problems Sister     No Known Problems Brother     No Known Problems Maternal Aunt     No Known Problems Maternal Uncle     No Known Problems Paternal Aunt     No Known Problems Paternal Uncle     No Known Problems Maternal Grandmother     No Known Problems Maternal Grandfather     No Known Problems Paternal Grandmother     No Known Problems Paternal Grandfather     Amblyopia Neg Hx     Blindness Neg Hx     Cancer Neg Hx     Diabetes Neg Hx     Hypertension Neg Hx     Macular degeneration Neg Hx     Retinal detachment Neg Hx     Strabismus Neg Hx     Stroke Neg Hx     Thyroid disease Neg Hx      Social History     Socioeconomic History    Marital status: Legally      Spouse name: Not on file    Number of children: Not on file    Years of education: Not on file    Highest " education level: Not on file   Occupational History    Not on file   Social Needs    Financial resource strain: Not on file    Food insecurity:     Worry: Not on file     Inability: Not on file    Transportation needs:     Medical: Not on file     Non-medical: Not on file   Tobacco Use    Smoking status: Former Smoker   Substance and Sexual Activity    Alcohol use: No    Drug use: Not on file    Sexual activity: Not on file   Lifestyle    Physical activity:     Days per week: Not on file     Minutes per session: Not on file    Stress: Not on file   Relationships    Social connections:     Talks on phone: Not on file     Gets together: Not on file     Attends Zoroastrian service: Not on file     Active member of club or organization: Not on file     Attends meetings of clubs or organizations: Not on file     Relationship status: Not on file   Other Topics Concern    Not on file   Social History Narrative    Not on file        amitriptyline  25 mg Oral QHS    carbidopa-levodopa  mg  2 tablet Oral Nightly    cefTRIAXone (ROCEPHIN) IVPB  1 g Intravenous Q24H    fluticasone furoate-vilanterol  1 puff Inhalation Daily    levETIRAcetam  500 mg Oral BID     Review of patient's allergies indicates:   Allergen Reactions    Contrast media      ROS:  No chest pain or dyspnea.  No dysuria.  No heartburn or dysphagia.  Otherwise as stated above.  Ten other systems negative.    Vitals:    05/28/19 0400 05/28/19 0430 05/28/19 0500 05/28/19 0530   BP: 136/63 136/67 137/79 132/77   Pulse: 99 106 101 96   Resp: 20 (!) 21 18 (!) 22   Temp: 98.5 °F (36.9 °C)      TempSrc: Oral      SpO2: 99% (!) 92% 97% 97%   Weight:       Height:         P.E.:  GEN: A x O x 3, NAD  SKIN: No jaundice  HEENT: EOMI, PERRL, anicteric sclera  CV: RRR, no M/R/G  Chest: CTA B  Abdomen: soft, NTND, normoactive BS  Ext: No C/C/E.  2+ dorsalis pedis pulses B  Neuro: No asterixes or tremors.  CN II-XII intact  Musculoskeletal: 5/5 strength  bilaterally    Labs:  Lab Results   Component Value Date    WBC 4.97 05/28/2019    HGB 9.6 (L) 05/28/2019    HCT 31.0 (L) 05/28/2019    MCV 93 05/28/2019     05/28/2019     CMP  Sodium   Date Value Ref Range Status   05/27/2019 141 136 - 145 mmol/L Final     Potassium   Date Value Ref Range Status   05/27/2019 4.4 3.5 - 5.1 mmol/L Final     Chloride   Date Value Ref Range Status   05/27/2019 111 (H) 95 - 110 mmol/L Final     CO2   Date Value Ref Range Status   05/27/2019 19 (L) 23 - 29 mmol/L Final     Glucose   Date Value Ref Range Status   05/27/2019 121 (H) 70 - 110 mg/dL Final     BUN, Bld   Date Value Ref Range Status   05/27/2019 14 8 - 23 mg/dL Final     Creatinine   Date Value Ref Range Status   05/27/2019 0.9 0.5 - 1.4 mg/dL Final     Calcium   Date Value Ref Range Status   05/27/2019 7.5 (L) 8.7 - 10.5 mg/dL Final     Total Protein   Date Value Ref Range Status   05/27/2019 5.3 (L) 6.0 - 8.4 g/dL Final     Albumin   Date Value Ref Range Status   05/27/2019 2.1 (L) 3.5 - 5.2 g/dL Final     Total Bilirubin   Date Value Ref Range Status   05/27/2019 0.7 0.1 - 1.0 mg/dL Final     Comment:     For infants and newborns, interpretation of results should be based  on gestational age, weight and in agreement with clinical  observations.  Premature Infant recommended reference ranges:  Up to 24 hours.............<8.0 mg/dL  Up to 48 hours............<12.0 mg/dL  3-5 days..................<15.0 mg/dL  6-29 days.................<15.0 mg/dL       Alkaline Phosphatase   Date Value Ref Range Status   05/27/2019 201 (H) 55 - 135 U/L Final     AST   Date Value Ref Range Status   05/27/2019 43 (H) 10 - 40 U/L Final     ALT   Date Value Ref Range Status   05/27/2019 13 10 - 44 U/L Final     Anion Gap   Date Value Ref Range Status   05/27/2019 11 8 - 16 mmol/L Final     eGFR if    Date Value Ref Range Status   05/27/2019 >60 >60 mL/min/1.73 m^2 Final     eGFR if non    Date Value Ref  Range Status   05/27/2019 >60 >60 mL/min/1.73 m^2 Final     Comment:     Calculation used to obtain the estimated glomerular filtration  rate (eGFR) is the CKD-EPI equation.          Recent Labs   Lab 05/27/19  1807   INR 1.2   APTT 33.8*       A/P:  The patient is a 63 year old woman with a history of HTN, DM, GERD, and recently diagnosed hepatitis C presenting with a GI bleed.   1.  GI Bleed - the etiology of her bleeding is unclear.  She may have cirrhosis from hepatitis C.  An ultrasound will be obtained.  If she has ascites, a therapeutic paracentesis with fluid studies can be obtained.  She was transfused 2 units of pRBCs with an over correction in her H/H.  The protonix drip, octreotide drip, and ceftriaxone can be continued.  She can undergo an EGD.  If she no longer bleeds, she can undergo an outpatient screening colonoscopy.    Thank you for this consult.

## 2019-05-28 NOTE — H&P
[facility] Medicine  History & Physical    Patient Name: Jeanie Malhotra  MRN: [id number]  Admission Date: 05/28/2019  Attending Physician: Vu Rebolledo MD, MPH      PCP:     Jaxon Sharp MD    CC:     Chief Complaint   Patient presents with    Rectal Bleeding     Reports having diarrhea with  noted  dark tarry stools about 1 hour ago. States taking pepto bismol around 1130 today. Swelling to belling reported, and weakness       HISTORY OF PRESENT ILLNESS:     Jeanie Malhotra is a 63 y.o. female that (in part)  has a past medical history of Diabetes mellitus, GERD (gastroesophageal reflux disease), and Hypertension.  has a past surgical history that includes Breast cyst excision and Abdominal surgery. Presents to [facility] Emergency Department complaining of rectal bleeding which started earlier today.  She has had increasing abdominal distension over the last week with progressive worsening over the weekend.  She was previously undergoing evaluation for liver dysfunction and was diagnosed with hepatitis C and February of this year.  However, she lost her insurance and has not been able to follow-up as recommended.  Denies fever chills.  Positive for confusion.  Patient is not the best historian due to suspected metabolic encephalopathy.  She has had dyspnea with exertion and shortness of breath at rest.  Increasing skin pallor, easy bruising, and loose stools.  Positive for melena and hematochezia.  Patient has history of abdominal surgery occurring over 10 years ago but cannot specify exactly what was performed.  History is otherwise unreliable in limited at this time.    In the emergency department routine laboratory studies were obtained and rectal exam was performed with evidence of Hemoccult-positive stool.  She had a significantly decreased hemoglobin of 5.8 and elevated lactic acid level.  Exam was concerning for significant ascites, however, there  was no concern for spontaneous bacterial peritonitis with lack of fever and no tenderness on exam.  Gastroenterology was consulted and recommended initiation of Protonix infusion, octreotide, and ceftriaxone.  Patient will be NPO.  Reglan given.  Planning for endoscopy in the morning.    Hospital medicine has been asked to admit for further evaluation and treatment.       REVIEW OF SYSTEMS:     Unreliable review of systems otherwise given above in the HPI due to metabolic encephalopathy    PAST MEDICAL / SURGICAL HISTORY:     Past Medical History:   Diagnosis Date    Diabetes mellitus     GERD (gastroesophageal reflux disease)     Hypertension      Past Surgical History:   Procedure Laterality Date    ABDOMINAL SURGERY      BREAST CYST EXCISION           FAMILY HISTORY:     Family History   Problem Relation Age of Onset    Cataracts Mother     Glaucoma Mother     No Known Problems Father     No Known Problems Sister     No Known Problems Brother     No Known Problems Maternal Aunt     No Known Problems Maternal Uncle     No Known Problems Paternal Aunt     No Known Problems Paternal Uncle     No Known Problems Maternal Grandmother     No Known Problems Maternal Grandfather     No Known Problems Paternal Grandmother     No Known Problems Paternal Grandfather     Amblyopia Neg Hx     Blindness Neg Hx     Cancer Neg Hx     Diabetes Neg Hx     Hypertension Neg Hx     Macular degeneration Neg Hx     Retinal detachment Neg Hx     Strabismus Neg Hx     Stroke Neg Hx     Thyroid disease Neg Hx          SOCIAL HISTORY:     Social History     Socioeconomic History    Marital status: Legally      Spouse name: Not on file    Number of children: Not on file    Years of education: Not on file    Highest education level: Not on file   Occupational History    Not on file   Social Needs    Financial resource strain: Not on file    Food insecurity:     Worry: Not on file     Inability: Not  on file    Transportation needs:     Medical: Not on file     Non-medical: Not on file   Tobacco Use    Smoking status: Former Smoker   Substance and Sexual Activity    Alcohol use: No    Drug use: Not on file    Sexual activity: Not on file   Lifestyle    Physical activity:     Days per week: Not on file     Minutes per session: Not on file    Stress: Not on file   Relationships    Social connections:     Talks on phone: Not on file     Gets together: Not on file     Attends [employer]ism service: Not on file     Active member of club or organization: Not on file     Attends meetings of clubs or organizations: Not on file     Relationship status: Not on file   Other Topics Concern    Not on file   Social History Narrative    Not on file         ALLERGIES:       Review of patient's allergies indicates:   Allergen Reactions    Contrast media          HEALTH SCREENING:     Influenza vaccine not up-to-date for this season.  Prevnar 13 pneumonia vaccine = no evidence of previous vaccination found in the medical record      HOME MEDICATIONS:     Prior to Admission medications    Medication Sig Start Date End Date Taking? Authorizing Provider   alprazolam (XANAX) 0.25 MG tablet TAKE 1 TABLET BY MOUTH 3 TIMES A DAY AS NEEDED FOR ANXIETY 6/17/16  Yes Historical Provider, MD   atorvastatin (LIPITOR) 40 MG tablet Take 40 mg by mouth once daily. 6/17/16  Yes Historical Provider, MD   butalbital-acetaminophen-caffeine -40 mg (FIORICET, ESGIC) -40 mg per tablet Take 1 tablet by mouth once daily. 6/17/16  Yes Historical Provider, MD   carbidopa-levodopa  mg (SINEMET CR)  mg TbSR Take 2 tablets by mouth nightly. 6/1/16  Yes Historical Provider, MD   levetiracetam (KEPPRA) 250 MG Tab Take 500 mg by mouth 2 (two) times daily.  6/29/16  Yes Historical Provider, MD   losartan (COZAAR) 25 MG tablet Take 25 mg by mouth once daily.   Yes Historical Provider, MD   metformin (GLUCOPHAGE) 1000 MG tablet Take  1,000 mg by mouth 2 (two) times daily with meals.     Yes Historical Provider, MD   aspirin (ECOTRIN) 81 MG EC tablet Take 81 mg by mouth once daily.  5/28/19 Yes Historical Provider, MD   ADVAIR DISKUS 250-50 mcg/dose diskus inhaler USE 1 INHALATION BY MOUTH TWICE A DAY 6/29/16   Historical Provider, MD   amitriptyline (ELAVIL) 25 MG tablet Take 25 mg by mouth every evening. 6/17/16   Historical Provider, MD   candesartan-hydrochlorothiazid (ATACAND HCT) 32-12.5 mg per tablet Take 1 tablet by mouth once daily. 6/17/16   Historical Provider, MD   EASY TOUCH TWIST LANCETS 33 gauge Misc USE TO CHECK BLOOD SUGAR DAILY 6/17/16   Historical Provider, MD   hydrocodone-acetaminophen 10-325mg (NORCO)  mg Tab Take 1 tablet by mouth 2 (two) times daily. 6/17/16   Historical Provider, MD   LIRAGLUTIDE (VICTOZA 2-ANABEL SUBQ) Inject into the skin.    Historical Provider, MD   TRUETEST TEST STRIPS Strp USE TO CHECK BLOOD SUGAR DAILY 6/21/16   Historical Provider, MD   VENTOLIN HFA 90 mcg/actuation inhaler Inhale 2 puffs into the lungs every 4 (four) hours as needed. 6/29/16   Historical Provider, MD   ZOSTAVAX, , 19,400 unit/0.65 mL injection  6/7/16   Historical Provider, MD          [facility] MEDICATIONS:     Scheduled Meds:    cefTRIAXone (ROCEPHIN) IVPB  1 g Intravenous Q24H     Continuous Infusions:    octreotide (SANDOSTATIN) infusion 50 mcg/hr (05/28/19 0400)    pantoprazole 40 mg in dextrose 5 % 100 mL infusion (ready to mix system) 8 mg/hr (05/28/19 0400)     PRN Meds: sodium chloride, morphine, ondansetron, sodium chloride 0.9%      PHYSICAL EXAM:     Wt Readings from Last 1 Encounters:   05/27/19 2350 62.5 kg (137 lb 12.6 oz)   05/27/19 1614 56.2 kg (124 lb)     Body mass index is 27.83 kg/m².  Vitals:    05/28/19 0300 05/28/19 0307 05/28/19 0330 05/28/19 0400   BP: 130/73  (!) 117/55 136/63   Pulse: 98  96 99   Resp: (!) 25  18 20   Temp:  98.5 °F (36.9 °C)  98.5 °F (36.9 °C)   TempSrc:  Oral  Oral   SpO2: 97%   (!) 94% 99%   Weight:       Height:              -- General appearance:  Chronically ill-appearing female who is lying in bed.  well developed. appears stated age   -- Head: normocephalic, atraumatic   -- Eyes:  Pale conjunctivae . Extraocular muscles intact  -- Nose: Nares normal. Septum midline.   -- Mouth/Throat:  Pale oral mucosa. no throat erythema.   -- Neck: supple, symmetrical, trachea midline, no JVD and thyroid not grossly enlarged, appears symmetric  -- Lungs: clear to auscultation bilaterally. normal respiratory effort. No use of accessory muscles.   -- Chest wall: no tenderness. equal bilateral chest rise   -- Heart:  Rapid rate and regular rhythm. S1, S2 normal.  no click, rub or gallop   -- Abdomen:  Distended abdomen with positive fluid wave.  Well-healed ventral surgical scar.  soft, non-tender, non-tympanic; bowel sounds distant.  Positive for pattern megaly  -- Extremities: no cyanosis, clubbing .  1+ bilateral lower extremity pitting edema  -- Pulses: 2+ and symmetric   -- Skin:  Skin pallor, texture normal, turgor normal.  Scattered ecchymoses  -- Neurologic: Normal strength and tone. No focal numbness or weakness. CNII-XII intact. [place] coma scale: eyes open spontaneously-4, oriented & converses-5, obeys commands-6.      LABORATORY STUDIES:     Recent Results (from the past 36 hour(s))   Blood culture #1 **CANNOT BE ORDERED STAT**    Collection Time: 05/27/19  5:30 PM   Result Value Ref Range    Blood Culture, Routine No Growth to date    CBC auto differential    Collection Time: 05/27/19  6:07 PM   Result Value Ref Range    WBC 4.70 3.90 - 12.70 K/uL    RBC 2.02 (L) 4.00 - 5.40 M/uL    Hemoglobin 5.8 (LL) 12.0 - 16.0 g/dL    Hematocrit 19.8 (LL) 37.0 - 48.5 %    Mean Corpuscular Volume 98 82 - 98 fL    Mean Corpuscular Hemoglobin 28.7 27.0 - 31.0 pg    Mean Corpuscular Hemoglobin Conc 29.3 (L) 32.0 - 36.0 g/dL    RDW 15.3 (H) 11.5 - 14.5 %    Platelets 166 150 - 350 K/uL    MPV 10.5 9.2 -  12.9 fL    Gran # (ANC) 3.5 1.8 - 7.7 K/uL    Lymph # 0.6 (L) 1.0 - 4.8 K/uL    Mono # 0.6 0.3 - 1.0 K/uL    Eos # 0.0 0.0 - 0.5 K/uL    Baso # 0.01 0.00 - 0.20 K/uL    Gran% 74.9 (H) 38.0 - 73.0 %    Lymph% 11.7 (L) 18.0 - 48.0 %    Mono% 12.8 4.0 - 15.0 %    Eosinophil% 0.6 0.0 - 8.0 %    Basophil% 0.2 0.0 - 1.9 %    Platelet Estimate Appears normal     Aniso Moderate     Poly Occasional     Hypo Moderate     Ovalocytes Occasional     Tear Drop Cells Occasional     Smudge Cells Present     Differential Method Automated    Comprehensive metabolic panel    Collection Time: 05/27/19  6:07 PM   Result Value Ref Range    Sodium 141 136 - 145 mmol/L    Potassium 4.4 3.5 - 5.1 mmol/L    Chloride 111 (H) 95 - 110 mmol/L    CO2 19 (L) 23 - 29 mmol/L    Glucose 121 (H) 70 - 110 mg/dL    BUN, Bld 14 8 - 23 mg/dL    Creatinine 0.9 0.5 - 1.4 mg/dL    Calcium 7.5 (L) 8.7 - 10.5 mg/dL    Total Protein 5.3 (L) 6.0 - 8.4 g/dL    Albumin 2.1 (L) 3.5 - 5.2 g/dL    Total Bilirubin 0.7 0.1 - 1.0 mg/dL    Alkaline Phosphatase 201 (H) 55 - 135 U/L    AST 43 (H) 10 - 40 U/L    ALT 13 10 - 44 U/L    Anion Gap 11 8 - 16 mmol/L    eGFR if African American >60 >60 mL/min/1.73 m^2    eGFR if non African American >60 >60 mL/min/1.73 m^2   Type & Screen    Collection Time: 05/27/19  6:07 PM   Result Value Ref Range    Group & Rh O POS     Indirect Leticia NEG    Protime-INR    Collection Time: 05/27/19  6:07 PM   Result Value Ref Range    Prothrombin Time 13.0 (H) 9.0 - 12.5 sec    INR 1.2 0.8 - 1.2   APTT    Collection Time: 05/27/19  6:07 PM   Result Value Ref Range    aPTT 33.8 (H) 21.0 - 32.0 sec   Brain natriuretic peptide    Collection Time: 05/27/19  6:07 PM   Result Value Ref Range    BNP 50 0 - 99 pg/mL   Troponin I    Collection Time: 05/27/19  6:07 PM   Result Value Ref Range    Troponin I <0.006 0.000 - 0.026 ng/mL   Lactic acid, plasma    Collection Time: 05/27/19  6:07 PM   Result Value Ref Range    Lactate (Lactic Acid) 7.7 (HH)  0.5 - 2.2 mmol/L   Lipase    Collection Time: 05/27/19  6:07 PM   Result Value Ref Range    Lipase 43 4 - 60 U/L   Prepare RBC 2 Units; emergency    Collection Time: 05/27/19  6:07 PM   Result Value Ref Range    UNIT NUMBER [id number]     Product Code [id number]     DISPENSE STATUS TRANSFUSED     CODING SYSTEM BQOU212     Unit Blood Type Code 5100     Unit Blood Type O POS     Unit Expiration [id number]     UNIT NUMBER [id number]     Product Code [id number]     DISPENSE STATUS ISSUED     CODING SYSTEM IFFS610     Unit Blood Type Code 5100     Unit Blood Type O POS     Unit Expiration [id number]    Blood culture #2 **CANNOT BE ORDERED STAT**    Collection Time: 05/27/19  6:09 PM   Result Value Ref Range    Blood Culture, Routine No Growth to date    Blood culture #1 **CANNOT BE ORDERED STAT**    Collection Time: 05/27/19  7:52 PM   Result Value Ref Range    Blood Culture, Routine No Growth to date    Blood culture #2 **CANNOT BE ORDERED STAT**    Collection Time: 05/27/19  8:00 PM   Result Value Ref Range    Blood Culture, Routine No Growth to date    Acetaminophen level    Collection Time: 05/27/19  8:00 PM   Result Value Ref Range    Acetaminophen (Tylenol), Serum <3.0 (L) 10.0 - 20.0 ug/mL   Urinalysis, Reflex to Urine Culture    Collection Time: 05/27/19  8:51 PM   Result Value Ref Range    Specimen UA Urine, Catheterized     Color, UA Yellow Yellow, Straw, Jenna    Appearance, UA Clear Clear    pH, UA 5.0 5.0 - 8.0    Specific Gravity, UA 1.020 1.005 - 1.030    Protein, UA Negative Negative    Glucose, UA Negative Negative    Ketones, UA Trace (A) Negative    Bilirubin (UA) Negative Negative    Occult Blood UA Negative Negative    Nitrite, UA Negative Negative    Urobilinogen, UA Negative <2.0 EU/dL    Leukocytes, UA Trace (A) Negative   Urinalysis Microscopic    Collection Time: 05/27/19  8:51 PM   Result Value Ref Range    WBC, UA 4 0 - 5 /hpf    Bacteria Rare None-Occ /hpf    Microscopic Comment SEE  COMMENT        Lab Results   Component Value Date    INR 1.2 05/27/2019    INR 1.1 01/21/2019     Lab Results   Component Value Date    HGBA1C 6.4 (H) 01/02/2019     No results for input(s): POCTGLUCOSE in the last 72 hours.        MICROBIOLOGY DATA:     No results found for: LABGENI, LABREFE, LABUPPE, LABURIN, LABAFB    Microbiology x 7d:   Microbiology Results (last 7 days)     Procedure Component Value Units Date/Time    Blood culture #1 **CANNOT BE ORDERED STAT** [[id number]] Collected:  05/27/19 1952    Order Status:  Completed Specimen:  Blood from Peripheral, Antecubital, Right Updated:  05/28/19 0312     Blood Culture, Routine No Growth to date    Blood culture #1 **CANNOT BE ORDERED STAT** [[id number]] Collected:  05/27/19 1730    Order Status:  Completed Specimen:  Blood from Peripheral, Hand, Left Updated:  05/28/19 0312     Blood Culture, Routine No Growth to date    Blood culture #2 **CANNOT BE ORDERED STAT** [[id number]] Collected:  05/27/19 1809    Order Status:  Completed Specimen:  Blood from Peripheral, Hand, Left Updated:  05/28/19 0312     Blood Culture, Routine No Growth to date    Blood culture #2 **CANNOT BE ORDERED STAT** [[id number]] Collected:  05/27/19 2000    Order Status:  Completed Specimen:  Blood from Peripheral, Antecubital, Right Updated:  05/28/19 0312     Blood Culture, Routine No Growth to date    Culture, Body Fluid (Aerobic) w/ GS [[id number]]     Order Status:  Canceled Specimen:  Body Fluid from Peritoneal Fluid     Gram stain [[id number]]     Order Status:  Canceled Specimen:  Body Fluid from Peritoneal Fluid             IMAGING:     Imaging Results          US Lower Extremity Veins Bilateral (Final result)  Result time 05/27/19 19:02:10    Final result by Laura Villatoro MD (05/27/19 19:02:10)                 Impression:      No evidence of lower extremity deep venous thrombosis.      Electronically signed by: Laura Villatoro MD  Date:    05/27/2019  Time:    19:02              Narrative:    EXAMINATION:  US LOWER EXTREMITY VEINS BILATERAL    CLINICAL HISTORY:  Other specified soft tissue disorders    TECHNIQUE:  Duplex and color flow Doppler evaluation of the bilateral lower extremity veins was performed.    COMPARISON:  None    FINDINGS:  No evidence of clot involving the bilateral common femoral, greater saphenous, femoral, popliteal, peroneal, anterior and posterior tibial veins.  All venous structures demonstrate normal respiratory phasicity and augment adequately.  No evidence of soft tissue mass or Baker's cyst.                                  CONSULTS:     IP CONSULT TO GI  IP CONSULT TO SOCIAL WORK/CASE MANAGEMENT       ASSESSMENT & PLAN:     Primary Diagnosis:  Acute upper GI bleeding    Active Hospital Problems    Diagnosis  POA    *Acute upper GI bleeding [K92.2]  Yes     Priority: 1 - High    Symptomatic anemia [D64.9]  Yes     Priority: 2     Cirrhosis of liver with ascites [K74.60, R18.8]  Yes     Priority: 3     Hepatitis C [B19.20]  Yes     Priority: 4     Ascites [R18.8]  Yes     Priority: 5     Diabetes mellitus, type 2 [E11.9]  Yes     Priority: 6       Resolved Hospital Problems   No resolved problems to display.       Acute Upper GI bleeding likely due to esophageal varices secondary to liver cirrhosis  Exam was concerning for significant ascites, however, there was no concern for spontaneous bacterial peritonitis with lack of fever and no tenderness on exam.  Gastroenterology was consulted and recommended initiation of Protonix infusion, octreotide, and ceftriaxone.  Patient will be NPO.  Reglan given.  Planning for endoscopy in the morning.  · As evidence by physical exam and positive occult blood testing  · Possible etiology includes: hemorrhagic gastritis, ulcer (duodenal / gastric), gastric varices, malignancy, [place] tear, colitis, colon cancer, internal hemorrhoids  · Monitor with serial H&H  · Give Protonix 80 mg IV bolus followed by 8 mg per  hour IV drip  · Sandostatin 50mcg every 8 hours  · Ceftriaxone  · Hold all anticoagulation medications  · N.p.o. except  Medications  · Provide IV fluids  · Obtain blood type and screen  · Maintain two large-bore IVs at all times  · Consult gastroenterology    Anemia due to cirrhosis of the liver and acute GI bleeding  · The patient's H/H is very low and there is indication for transfusion with a goal hemoglobin of approximately 8.  · Patient agrees to transfusion of PRBCs as she is exhibiting signs and symptoms of acute bleeding.  · Will continue to monitor closely during transfusion.    Cirrhosis of liver with ascites secondary to hepatitis C and cirrhosis  Currently undergoing evaluation with gastroenterology which was to pearly tripped due to insurance reasons.  GI consulted and will continue to follow up as an outpatient after this inpatient workup      VTE Risk Mitigation (From admission, onward)        Ordered     Place sequential compression device  Until discontinued      05/28/19 0146     IP VTE LOW RISK PATIENT  Once      05/27/19 2350            Adult PRN medications available   DVT prophylaxis given       DISPOSITION:     Will admit to the Hospital Medicine service for further evaluation and treatment.    Chart reviewed and updated where applicable.    High Risk Conditions:  Patient has a condition that poses threat to life and bodily function:  Acute upper GI bleeding with symptomatic in      ===============================================================    Vu Rebolledo MD, MPH  Department of Hospital Medicine   [facility]  [phone] pg  (7pm - 6am)          This note is dictated using [employer] voice recognition software.  There are word recognition mistakes that are occasionally missed on review.

## 2019-05-28 NOTE — SUBJECTIVE & OBJECTIVE
Past Medical History:   Diagnosis Date    Diabetes mellitus     GERD (gastroesophageal reflux disease)     Hypertension        Past Surgical History:   Procedure Laterality Date    ABDOMINAL SURGERY      BREAST CYST EXCISION         Review of patient's allergies indicates:   Allergen Reactions    Contrast media        Family History     Problem Relation (Age of Onset)    Cataracts Mother    Glaucoma Mother    No Known Problems Father, Sister, Brother, Maternal Aunt, Maternal Uncle, Paternal Aunt, Paternal Uncle, Maternal Grandmother, Maternal Grandfather, Paternal Grandmother, Paternal Grandfather        Tobacco Use    Smoking status: Former Smoker   Substance and Sexual Activity    Alcohol use: No    Drug use: Not on file    Sexual activity: Not on file         Review of Systems   Constitutional: Positive for activity change, appetite change, fatigue and unexpected weight change (decreased 9 lbs. ). Negative for chills, diaphoresis and fever.   HENT: Negative for congestion, dental problem, ear pain, facial swelling, postnasal drip, rhinorrhea, trouble swallowing and voice change.    Eyes: Negative for photophobia, pain, redness and visual disturbance.   Respiratory: Positive for shortness of breath. Negative for cough, choking, chest tightness, wheezing and stridor.    Cardiovascular: Positive for leg swelling (R>L ). Negative for chest pain and palpitations.   Gastrointestinal: Positive for abdominal distention, abdominal pain, blood in stool, diarrhea and nausea. Negative for constipation, rectal pain and vomiting.   Endocrine: Negative for polyphagia and polyuria.   Genitourinary: Negative for dysuria, flank pain and hematuria.   Musculoskeletal: Negative for arthralgias, back pain, gait problem, myalgias, neck pain and neck stiffness.   Skin: Negative for pallor and rash.   Neurological: Positive for weakness. Negative for dizziness, light-headedness and headaches.   Hematological: Negative for  adenopathy.     Additional 12 Point ROS is negative except as stated.       Objective:     Vital Signs (Most Recent):  Temp: 98.4 °F (36.9 °C) (05/28/19 0715)  Pulse: 100 (05/28/19 1100)  Resp: (!) 67 (05/28/19 1100)  BP: (!) 142/71 (05/28/19 1000)  SpO2: (!) 74 % (05/28/19 1100) Vital Signs (24h Range):  Temp:  [98 °F (36.7 °C)-98.7 °F (37.1 °C)] 98.4 °F (36.9 °C)  Pulse:  [] 100  Resp:  [13-67] 67  SpO2:  [74 %-100 %] 74 %  BP: ()/(46-92) 142/71     Weight: 62.5 kg (137 lb 12.6 oz)  Body mass index is 27.83 kg/m².      Intake/Output Summary (Last 24 hours) at 5/28/2019 1112  Last data filed at 5/28/2019 1100  Gross per 24 hour   Intake 2611.17 ml   Output 150 ml   Net 2461.17 ml       Physical Exam   Constitutional: She is oriented to person, place, and time. No distress.   Older than stated age    HENT:   Head: Normocephalic and atraumatic.   Right Ear: External ear normal.   Left Ear: External ear normal.   Nose: Nose normal.   Mouth/Throat: Oropharynx is clear and moist. No oropharyngeal exudate.   Eyes: Pupils are equal, round, and reactive to light. Conjunctivae and EOM are normal. Right eye exhibits no discharge. Left eye exhibits no discharge. No scleral icterus.   No scleral icterus      Neck: Normal range of motion. No JVD present. No tracheal deviation present. No thyromegaly present.   Cardiovascular: Normal rate, regular rhythm and normal heart sounds. Exam reveals no friction rub.   No murmur heard.  Pulmonary/Chest: Effort normal. No respiratory distress. She has no wheezes. She has rales (bibasilar rales. Decreased BS right base. ).   Abdominal: Soft. She exhibits distension. She exhibits no mass. There is tenderness. There is no rebound and no guarding. No hernia.   Musculoskeletal: Normal range of motion. She exhibits edema (trace edema bilateral LE. ). She exhibits no tenderness or deformity.   Lymphadenopathy:     She has no cervical adenopathy.   Neurological: She is alert and  oriented to person, place, and time. No sensory deficit. She exhibits normal muscle tone. Coordination normal.   Skin: Skin is warm and dry. Capillary refill takes less than 2 seconds. No rash noted. She is not diaphoretic. No erythema. No pallor.   Psychiatric: She has a normal mood and affect. Her behavior is normal.   Nursing note and vitals reviewed.      Vents:       Lines/Drains/Airways     Peripheral Intravenous Line                 Peripheral IV - Single Lumen 05/27/19 18 G Left Hand 1 day         Peripheral IV - Single Lumen 05/28/19 0245 20 G Left Forearm less than 1 day         Peripheral IV - Single Lumen 05/28/19 0306 18 G Right Upper Arm less than 1 day                Significant Labs:    CBC/Anemia Profile:  Recent Labs   Lab 05/27/19 1807 05/28/19  0538   WBC 4.70 4.97   HGB 5.8* 9.6*   HCT 19.8* 31.0*    164   MCV 98 93   RDW 15.3* 15.9*        Chemistries:  Recent Labs   Lab 05/27/19 1807 05/28/19  0538    142   K 4.4 5.0   * 112*   CO2 19* 21*   BUN 14 19   CREATININE 0.9 0.9   CALCIUM 7.5* 7.9*   ALBUMIN 2.1* 2.5*   PROT 5.3* 5.9*   BILITOT 0.7 1.0   ALKPHOS 201* 178*   ALT 13 17   AST 43* 50*       INR 1.2          Ammonia 10 - 50 umol/L 135High             Lactate (Lactic Acid) 0.5 - 2.2 mmol/L 2.4High   7.7High Panic  CM          Acetaminophen (Tylenol), Serum 10.0 - 20.0 ug/mL <3.0Low           Hepatitis B Surface Ag Negative    Hep B C IgM Negative    Hep A IgM Negative    Hepatitis C Ab PositiveAbnormal       ago     BNP 0 - 99 pg/mL 50          Significant Imaging:   I have reviewed and interpreted all pertinent imaging results/findings within the past 24 hours.     Abdominal Ultrasound-     1. Echogenic liver with nodularity to the surface of the liver with findings suggestive of antegrade flow in the portal vein, likely representing changes from cirrhosis of the liver.  Clinical correlation suggested.  2. Borderline splenomegaly.  3. Cholelithiasis.  4. Gallbladder  wall edema or pericholecystic fluid collection either from cholecystitis or may be related to ascites.  5. Four-quadrant ascites    LE Doppler:     No evidence of lower extremity deep venous thrombosis.

## 2019-05-28 NOTE — HPI
64 y/o female with underlying DM, GERD. Recently diagnosed Hep C and lost to follow up secondary to insurance changes. Enlarging abdomen, LE edema and epigastric abdominal pain. One day PTA had dark stools with some BRBPR noted as well and presented to ED. Hb 5.8 and transfused 2 U PRBC. No additional bleeding since admission. Plan to undergo EGD today. On protonix/octreotide and rocephin at present. She has Abdominal U/S this AM demonstrating nodular hepatic echogenicity and moderate ascites. I have been asked by hospitalist service to perform a diagnostic and therapeutic paracentesis.

## 2019-05-28 NOTE — HPI
Jeanie Malhotra is a 63 y.o. female that (in part)  has a past medical history of Diabetes mellitus, GERD (gastroesophageal reflux disease), and Hypertension.  has a past surgical history that includes Breast cyst excision and Abdominal surgery. Presents to [facility] Emergency Department complaining of rectal bleeding which started earlier today.  She has had increasing abdominal distension over the last week with progressive worsening over the weekend.  She was previously undergoing evaluation for liver dysfunction and was diagnosed with hepatitis C and February of this year.  However, she lost her insurance and has not been able to follow-up as recommended.  Denies fever chills.  Positive for confusion.  Patient is not the best historian due to suspected metabolic encephalopathy.  She has had dyspnea with exertion and shortness of breath at rest.  Increasing skin pallor, easy bruising, and loose stools.  Positive for melena and hematochezia.  Patient has history of abdominal surgery occurring over 10 years ago but cannot specify exactly what was performed.  History is otherwise unreliable in limited at this time.    In the emergency department routine laboratory studies were obtained and rectal exam was performed with evidence of Hemoccult-positive stool.  She had a significantly decreased hemoglobin of 5.8 and elevated lactic acid level.    Hospital medicine has been asked to admit for further evaluation and treatment.

## 2019-05-28 NOTE — ASSESSMENT & PLAN NOTE
Secondary to acute blood loss. Anemia studies pending. Transfusion threshold < 7 unless overt hemorrhage

## 2019-05-28 NOTE — DISCHARGE SUMMARY
[facility]  Discharge Summary      Admit Date: 5/27/2019    Discharge Date and Time:  05/28/2019 1:54 PM    Attending Physician: Marily Mcfarlane MD     Reason for Admission: GI Bleed    Procedures Performed: Procedure(s) (LRB):  EGD (ESOPHAGOGASTRODUODENOSCOPY) (Left)    Hospital Course (synopsis of major diagnoses, care, treatment, and services provided during the course of the hospital stay): Ongoing     Consults: GI    Significant Diagnostic Studies: EGD    Final Diagnoses:    Principal Problem: Acute upper GI bleeding   Secondary Diagnoses: EGD    Discharged Condition: good    Disposition: Admitted as an Inpatient    Follow Up/Patient Instructions: Follow-up with referring physician             Resume previous diet and activity.    Medications:  Transfer Medications (for Discharge Readmit only):   Current Facility-Administered Medications   Medication Dose Route Frequency Provider Last Rate Last Dose    0.9%  NaCl infusion (for blood administration)   Intravenous Q24H PRN Vu Rebolledo MD        0.9%  NaCl infusion   Intravenous Continuous Napoleon Jackson MD 50 mL/hr at 05/28/19 1319      amitriptyline tablet 25 mg  25 mg Oral QHS Vu Rebolledo MD        carbidopa-levodopa  mg TBSR 2 tablet  2 tablet Oral Nightly Vu Rebolledo MD        cefTRIAXone (ROCEPHIN) 1 g in dextrose 5 % 50 mL IVPB  1 g Intravenous Q24H Vu Rebolledo MD   1 g at 05/28/19 0044    dextrose 50% injection 12.5 g  12.5 g Intravenous PRN Vu Rebolledo MD        fluticasone furoate-vilanterol 200-25 mcg/dose diskus inhaler 1 puff  1 puff Inhalation Daily Vu Rebolledo MD   1 puff at 05/28/19 0810    glucagon (human recombinant) injection 1 mg  1 mg Intramuscular PRN Vu Rebolledo MD        insulin aspart U-100 pen 1-10 Units  1-10 Units Subcutaneous Q6H PRN Vu Rebolledo MD   2 Units at 05/28/19 0624    lactulose 20 gram/30 mL solution Soln 30 g  30 g Oral TID  Jairo Joy MD        levETIRAcetam tablet 500 mg  500 mg Oral BID Vu Rebolledo MD   500 mg at 05/28/19 0825    lidocaine (PF) 20 mg/mL (2%) 20 mg/mL (2 %) injection             lidocaine HCL 10 mg/ml (1%) injection 5 mL  5 mL Intradermal Once Adriana Escobar NP        morphine injection 4 mg  4 mg Intravenous Q6H PRN Vu Rebolledo MD   4 mg at 05/28/19 0709    octreotide (SANDOSTATIN) 500 mcg in sodium chloride 0.9% 100 mL infusion  50 mcg/hr Intravenous Continuous Vu Rebolledo MD 10 mL/hr at 05/28/19 1100 50 mcg/hr at 05/28/19 1100    ondansetron injection 8 mg  8 mg Intravenous Q8H PRN Vu Rebolledo MD   8 mg at 05/28/19 0824    pantoprazole 40 mg in dextrose 5 % 100 mL infusion (ready to mix system)  8 mg/hr Intravenous Continuous Vu Rebolledo MD 20 mL/hr at 05/28/19 1255 8 mg/hr at 05/28/19 1255    sodium chloride 0.9% flush 10 mL  10 mL Intravenous PRN Vu Rebolledo MD        sodium chloride 0.9% flush 10 mL  10 mL Intravenous PRN Jairo Joy MD         Facility-Administered Medications Ordered in Other Encounters   Medication Dose Route Frequency Provider Last Rate Last Dose    fentaNYL injection    PRN Sofi A Sticker, CRNA   50 mcg at 05/28/19 1334    glycopyrrolate injection    PRN Sofi A Sticker, CRNA   0.1 mg at 05/28/19 1320    lidocaine (cardiac) injection    PRN Sofi A Sticker, CRNA   20 mg at 05/28/19 1329    ondansetron injection    PRN Sofi A Sticker, CRNA   4 mg at 05/28/19 1324    propofol (DIPRIVAN) 10 mg/mL infusion    PRN Sofi A Sticker, CRNA   100 mg at 05/28/19 1329    succinylcholine injection    PRN Sofi A Sticker, CRNA   80 mg at 05/28/19 1329     No discharge procedures on file.  Follow-up Information     Jaxon Sharp MD. Go on 6/5/2019.    Specialty:  Family Medicine  Why:  Hospital Follow  Up on June 5 at 9:45am.  Contact information:  [address] [employer]  [facility]  [id number] [phone]

## 2019-05-28 NOTE — ASSESSMENT & PLAN NOTE
Plan for EGD today with GI. Hb overcorrected post transfusion. U/S with evidence of PH and underlying cirrhosis.     -- Continue PPI/Octreotide and Rocephin   -- EGD Today   -- Transfusion threshold <7 unless overt hemorrhage/hemodynamic instability   -- INR 1.2 at present. Monitor for coagulopathy

## 2019-05-28 NOTE — ANESTHESIA PREPROCEDURE EVALUATION
"                                                                                                             05/28/2019  Jeanie Malhotra is a 63 y.o., female.    Anesthesia Evaluation    I have reviewed the Patient Summary Reports.    I have reviewed the Nursing Notes.      Review of Systems  Anesthesia Hx:  No problems with previous Anesthesia  Denies Family Hx of Anesthesia complications.   Denies Personal Hx of Anesthesia complications.   Social:  Former Smoker    Hematology/Oncology:         -- Anemia:   Cardiovascular:   Exercise tolerance: poor Hypertension Denies MI.   Denies Dysrhythmias.      Pt had "fat removed around her heart" ~10 years ago; denies any MI, arrhythmias, CHF   Pulmonary:  Pulmonary Normal    Hepatic/GI:   GERD Liver Disease, Hepatitis, C Newly diagnosed hepatitis C w/ascites    Admitted for acute GI bleed; s/p 2u PRBC transfusion    Had surgery for reflux many years ago; she is unsure what procedure exactly   OB/GYN/PEDS:  Post-menopausal ~20 yrs    Neurological:   Seizures Last episode ~1 year ago   Endocrine:   Diabetes        Physical Exam  General:  frail   Airway/Jaw/Neck:  Airway Findings: Mouth Opening: Small, but > 3cm Tongue: Normal  Mallampati: III  TM Distance: 4 - 6 cm  Jaw/Neck Findings:  Neck ROM: Normal ROM  Neck Findings: Normal     Dental:  Dental Findings: Edentulous   Chest/Lungs:  Chest/Lungs Findings: Clear to auscultation, Normal Respiratory Rate     Heart/Vascular:  Heart Findings: Rate: Tachycardia  Rhythm: Regular Rhythm     Abdomen:  Abdomen Findings:  Distention, Ascites       Mental Status:  Mental Status Findings:  Cooperative  Answers questions appropriately     Wt Readings from Last 3 Encounters:   05/28/19 62.5 kg (137 lb 12.6 oz)   03/14/19 56.2 kg (124 lb)     Temp Readings from Last 3 Encounters:   05/28/19 36.9 °C (98.4 °F) (Oral)     BP Readings from Last 3 Encounters:   05/28/19 133/65     Pulse Readings from Last 3 Encounters:   05/28/19 84     Lab " Results   Component Value Date    WBC 4.97 05/28/2019    HGB 9.6 (L) 05/28/2019    HCT 31.0 (L) 05/28/2019    MCV 93 05/28/2019     05/28/2019     CMP  Sodium   Date Value Ref Range Status   05/28/2019 142 136 - 145 mmol/L Final     Potassium   Date Value Ref Range Status   05/28/2019 5.0 3.5 - 5.1 mmol/L Final     Chloride   Date Value Ref Range Status   05/28/2019 112 (H) 95 - 110 mmol/L Final     CO2   Date Value Ref Range Status   05/28/2019 21 (L) 23 - 29 mmol/L Final     Glucose   Date Value Ref Range Status   05/28/2019 148 (H) 70 - 110 mg/dL Final     BUN, Bld   Date Value Ref Range Status   05/28/2019 19 8 - 23 mg/dL Final     Creatinine   Date Value Ref Range Status   05/28/2019 0.9 0.5 - 1.4 mg/dL Final     Calcium   Date Value Ref Range Status   05/28/2019 7.9 (L) 8.7 - 10.5 mg/dL Final     Total Protein   Date Value Ref Range Status   05/28/2019 5.9 (L) 6.0 - 8.4 g/dL Final     Albumin   Date Value Ref Range Status   05/28/2019 2.5 (L) 3.5 - 5.2 g/dL Final     Total Bilirubin   Date Value Ref Range Status   05/28/2019 1.0 0.1 - 1.0 mg/dL Final     Comment:     For infants and newborns, interpretation of results should be based  on gestational age, weight and in agreement with clinical  observations.  Premature Infant recommended reference ranges:  Up to 24 hours.............<8.0 mg/dL  Up to 48 hours............<12.0 mg/dL  3-5 days..................<15.0 mg/dL  6-29 days.................<15.0 mg/dL       Alkaline Phosphatase   Date Value Ref Range Status   05/28/2019 178 (H) 55 - 135 U/L Final     AST   Date Value Ref Range Status   05/28/2019 50 (H) 10 - 40 U/L Final     ALT   Date Value Ref Range Status   05/28/2019 17 10 - 44 U/L Final     Anion Gap   Date Value Ref Range Status   05/28/2019 9 8 - 16 mmol/L Final     eGFR if    Date Value Ref Range Status   05/28/2019 >60 >60 mL/min/1.73 m^2 Final     eGFR if non    Date Value Ref Range Status   05/28/2019 >60  >60 mL/min/1.73 m^2 Final     Comment:     Calculation used to obtain the estimated glomerular filtration  rate (eGFR) is the CKD-EPI equation.            Anesthesia Plan  Type of Anesthesia, risks & benefits discussed:  Anesthesia Type:  general, MAC  Patient's Preference:   Intra-op Monitoring Plan: standard ASA monitors  Intra-op Monitoring Plan Comments:   Post Op Pain Control Plan: multimodal analgesia and per primary service following discharge from PACU  Post Op Pain Control Plan Comments:   Induction:   IV  Beta Blocker:  Patient is not currently on a Beta-Blocker (No further documentation required).       Informed Consent: Patient understands risks and agrees with Anesthesia plan.  Questions answered. Anesthesia consent signed with patient.  ASA Score: 3     Day of Surgery Review of History & Physical: I have interviewed and examined the patient. I have reviewed the patient's H&P dated:            Ready For Surgery From Anesthesia Perspective.

## 2019-05-28 NOTE — CONSULTS
[facility]  Pulmonology  Consult Note    Patient Name: Jeanie Malhotra  MRN: [id number]  Admission Date: 5/27/2019  Hospital Length of Stay: 1 days  Code Status: Full Code  Attending Physician: Marily Mcfarlane MD  Primary Care Provider: Jaxon Sharp MD   Principal Problem: Acute upper GI bleeding    Inpatient consult to Pulmonology  Consult performed by: Gonzalez Valladares MD  Consult ordered by: Marily Mcfarlane MD  Reason for consult: GI Bleed         Subjective:     HPI:  64 y/o female with underlying DM, GERD. Recently diagnosed Hep C and lost to follow up secondary to insurance changes. Enlarging abdomen, LE edema and epigastric abdominal pain. One day PTA had dark stools with some BRBPR noted as well and presented to ED. Hb 5.8 and transfused 2 U PRBC. No additional bleeding since admission. Plan to undergo EGD today. On protonix/octreotide and rocephin at present. She has Abdominal U/S this AM demonstrating nodular hepatic echogenicity and moderate ascites. I have been asked by hospitalist service to perform a diagnostic and therapeutic paracentesis.     Past Medical History:   Diagnosis Date    Diabetes mellitus     GERD (gastroesophageal reflux disease)     Hypertension        Past Surgical History:   Procedure Laterality Date    ABDOMINAL SURGERY      BREAST CYST EXCISION         Review of patient's allergies indicates:   Allergen Reactions    Contrast media        Family History     Problem Relation (Age of Onset)    Cataracts Mother    Glaucoma Mother    No Known Problems Father, Sister, Brother, Maternal Aunt, Maternal Uncle, Paternal Aunt, Paternal Uncle, Maternal Grandmother, Maternal Grandfather, Paternal Grandmother, Paternal Grandfather        Tobacco Use    Smoking status: Former Smoker   Substance and Sexual Activity    Alcohol use: No    Drug use: Not on file    Sexual activity: Not on file         Review of Systems   Constitutional: Positive for activity change, appetite  change, fatigue and unexpected weight change (decreased 9 lbs. ). Negative for chills, diaphoresis and fever.   HENT: Negative for congestion, dental problem, ear pain, facial swelling, postnasal drip, rhinorrhea, trouble swallowing and voice change.    Eyes: Negative for photophobia, pain, redness and visual disturbance.   Respiratory: Positive for shortness of breath. Negative for cough, choking, chest tightness, wheezing and stridor.    Cardiovascular: Positive for leg swelling (R>L ). Negative for chest pain and palpitations.   Gastrointestinal: Positive for abdominal distention, abdominal pain, blood in stool, diarrhea and nausea. Negative for constipation, rectal pain and vomiting.   Endocrine: Negative for polyphagia and polyuria.   Genitourinary: Negative for dysuria, flank pain and hematuria.   Musculoskeletal: Negative for arthralgias, back pain, gait problem, myalgias, neck pain and neck stiffness.   Skin: Negative for pallor and rash.   Neurological: Positive for weakness. Negative for dizziness, light-headedness and headaches.   Hematological: Negative for adenopathy.     Additional 12 Point ROS is negative except as stated.       Objective:     Vital Signs (Most Recent):  Temp: 98.4 °F (36.9 °C) (05/28/19 0715)  Pulse: 100 (05/28/19 1100)  Resp: (!) 67 (05/28/19 1100)  BP: (!) 142/71 (05/28/19 1000)  SpO2: (!) 74 % (05/28/19 1100) Vital Signs (24h Range):  Temp:  [98 °F (36.7 °C)-98.7 °F (37.1 °C)] 98.4 °F (36.9 °C)  Pulse:  [] 100  Resp:  [13-67] 67  SpO2:  [74 %-100 %] 74 %  BP: ()/(46-92) 142/71     Weight: 62.5 kg (137 lb 12.6 oz)  Body mass index is 27.83 kg/m².      Intake/Output Summary (Last 24 hours) at 5/28/2019 1112  Last data filed at 5/28/2019 1100  Gross per 24 hour   Intake 2611.17 ml   Output 150 ml   Net 2461.17 ml       Physical Exam   Constitutional: She is oriented to person, place, and time. No distress.   Older than stated age    HENT:   Head: Normocephalic and  atraumatic.   Right Ear: External ear normal.   Left Ear: External ear normal.   Nose: Nose normal.   Mouth/Throat: Oropharynx is clear and moist. No oropharyngeal exudate.   Eyes: Pupils are equal, round, and reactive to light. Conjunctivae and EOM are normal. Right eye exhibits no discharge. Left eye exhibits no discharge. No scleral icterus.   No scleral icterus      Neck: Normal range of motion. No JVD present. No tracheal deviation present. No thyromegaly present.   Cardiovascular: Normal rate, regular rhythm and normal heart sounds. Exam reveals no friction rub.   No murmur heard.  Pulmonary/Chest: Effort normal. No respiratory distress. She has no wheezes. She has rales (bibasilar rales. Decreased BS right base. ).   Abdominal: Soft. She exhibits distension. She exhibits no mass. There is tenderness. There is no rebound and no guarding. No hernia.   Musculoskeletal: Normal range of motion. She exhibits edema (trace edema bilateral LE. ). She exhibits no tenderness or deformity.   Lymphadenopathy:     She has no cervical adenopathy.   Neurological: She is alert and oriented to person, place, and time. No sensory deficit. She exhibits normal muscle tone. Coordination normal.   Skin: Skin is warm and dry. Capillary refill takes less than 2 seconds. No rash noted. She is not diaphoretic. No erythema. No pallor.   Psychiatric: She has a normal mood and affect. Her behavior is normal.   Nursing note and vitals reviewed.      Vents:       Lines/Drains/Airways     Peripheral Intravenous Line                 Peripheral IV - Single Lumen 05/27/19 18 G Left Hand 1 day         Peripheral IV - Single Lumen 05/28/19 0245 20 G Left Forearm less than 1 day         Peripheral IV - Single Lumen 05/28/19 0306 18 G Right Upper Arm less than 1 day                Significant Labs:    CBC/Anemia Profile:  Recent Labs   Lab 05/27/19  1807 05/28/19  0538   WBC 4.70 4.97   HGB 5.8* 9.6*   HCT 19.8* 31.0*    164   MCV 98 93    RDW 15.3* 15.9*        Chemistries:  Recent Labs   Lab 05/27/19  1807 05/28/19  0538    142   K 4.4 5.0   * 112*   CO2 19* 21*   BUN 14 19   CREATININE 0.9 0.9   CALCIUM 7.5* 7.9*   ALBUMIN 2.1* 2.5*   PROT 5.3* 5.9*   BILITOT 0.7 1.0   ALKPHOS 201* 178*   ALT 13 17   AST 43* 50*       INR 1.2          Ammonia 10 - 50 umol/L 135High             Lactate (Lactic Acid) 0.5 - 2.2 mmol/L 2.4High   7.7High Panic  CM          Acetaminophen (Tylenol), Serum 10.0 - 20.0 ug/mL <3.0Low           Hepatitis B Surface Ag Negative    Hep B C IgM Negative    Hep A IgM Negative    Hepatitis C Ab PositiveAbnormal       ago     BNP 0 - 99 pg/mL 50          Significant Imaging:   I have reviewed and interpreted all pertinent imaging results/findings within the past 24 hours.     Abdominal Ultrasound-     1. Echogenic liver with nodularity to the surface of the liver with findings suggestive of antegrade flow in the portal vein, likely representing changes from cirrhosis of the liver.  Clinical correlation suggested.  2. Borderline splenomegaly.  3. Cholelithiasis.  4. Gallbladder wall edema or pericholecystic fluid collection either from cholecystitis or may be related to ascites.  5. Four-quadrant ascites    LE Doppler:     No evidence of lower extremity deep venous thrombosis.    Assessment/Plan:     * Acute upper GI bleeding  Plan for EGD today with GI. Hb overcorrected post transfusion. U/S with evidence of PH and underlying cirrhosis.     -- Continue PPI/Octreotide and Rocephin   -- EGD Today   -- Transfusion threshold <7 unless overt hemorrhage/hemodynamic instability   -- INR 1.2 at present. Monitor for coagulopathy     Cirrhosis of liver with ascites  Hep C positive, treatment naive and genotype pending. U/S abdomen with cirrhosis and splenomegally suggestive of portal HTN. No thrombus. Clinically with ascites, LE edema. Ammonia 100's.     -- Management of bleeding per above   -- U/S with adequate pocket and will  proceed with paracentesis (consented patient and  at bedside)   -- Needs rifaxamin/lactulose for elevated ammonia once EGD completed.   -- Check CXR     Symptomatic anemia  Secondary to acute blood loss. Anemia studies pending. Transfusion threshold < 7 unless overt hemorrhage           DVT PPX- MARCY/SCD-- Pharmacologic contraindicated.   Nutrition- NPO     Critical Care Time: 45 minutes  Critical care was time spent personally by me on the following activities: evaluating this patient's organ dysfunction, development of treatment plan, discussing treatment plan with patient or surrogate and bedside caregivers, discussions with consultants, evaluation of patient's response to treatment, examination of patient, ordering and performing treatments and interventions, ordering and review of laboratory studies, ordering and review of radiographic studies, re-evaluation of patient's condition. This critical care time did not overlap with that of any other provider or involve time for any procedures.         Thank you for your consult. I will sign off. Please contact us if you have any additional questions.     Gonzalez Valladares MD  Pulmonology/Critical Care Medicine   [facility]

## 2019-05-28 NOTE — PLAN OF CARE
Problem: Adult Inpatient Plan of Care  Goal: Plan of Care Review  Outcome: Ongoing (interventions implemented as appropriate)  Patient remain is ICU. Paracentesis done at bedside this am and 1720ml clear fluid removed and sent to lab for testing. Patient then went to endoscopy for EGD, some small ulcers found, not bleeding. Patient now on clear liquid and vomited x1 this afternoon. Zofran and cool clothes used. Continues on Octreotide and protonix drips. Lactulose started today after EGD.

## 2019-05-28 NOTE — PLAN OF CARE
Problem: Adult Inpatient Plan of Care  Goal: Plan of Care Review  Outcome: Ongoing (interventions implemented as appropriate)  Patient on room air with no apparent distress noted at present time.  Will continue to monitor.

## 2019-05-28 NOTE — PROCEDURES
"Jeanie Malhotra is a 63 y.o. female patient.    Temp: 98.8 °F (37.1 °C) (19 1113)  Pulse: 99 (19 1113)  Resp: (!) 48 (19 1113)  BP: 139/68 (19 1113)  SpO2: (!) 87 % (19 1113)  Weight: 62.5 kg (137 lb 12.6 oz) (19)  Height: 4' 11" (149.9 cm) (19)       Paracentesis  Date/Time: 2019 12:59 PM  Location procedure was performed: [facility] PULMONARY MEDICINE  Performed by: Adriana Escobar NP  Authorized by: Adriana Escobar NP   Pre-operative diagnosis: Hepatitis C  Post-operative diagnosis: Hepatitis C  Consent Done: Yes  Consent: Verbal consent obtained. Written consent obtained.  Consent given by: patient  Patient understanding: patient states understanding of the procedure being performed  Patient consent: the patient's understanding of the procedure matches consent given  Procedure consent: procedure consent matches procedure scheduled  Relevant documents: relevant documents present and verified  Test results: test results available and properly labeled  Site marked: the operative site was marked  Patient identity confirmed: MRN, name, , verbally with patient and provided demographic data  Time out: Immediately prior to procedure a "time out" was called to verify the correct patient, procedure, equipment, support staff and site/side marked as required.  Initial or subsequent exam: initial  Procedure purpose: diagnostic and therapeutic  Indications: abdominal discomfort secondary to ascites    Anesthesia:  Local Anesthetic: lidocaine 1% without epinephrine  Anesthetic total: 7 mL  Patient sedated: no  Preparation: Patient was prepped and draped in the usual sterile fashion.  Needle gauge: 5 F.  Puncture site: right lower quadrant  Fluid removed: 1720(ml)  Fluid appearance: clear and serous  Dressing: 4x4 sterile gauze  Technical procedures used: ultrasound   Complications: No  Estimated blood loss (mL): 0  Patient tolerance: Patient tolerated the procedure well with " no immediate complications          Adriana Escobar  5/28/2019

## 2019-05-28 NOTE — ASSESSMENT & PLAN NOTE
Hep C positive, treatment naive and genotype pending. U/S abdomen with cirrhosis and splenomegally suggestive of portal HTN. No thrombus. Clinically with ascites, LE edema. Ammonia 100's.     -- Management of bleeding per above   -- U/S with adequate pocket and will proceed with paracentesis (consented patient and  at bedside)   -- Needs rifaxamin/lactulose for elevated ammonia once EGD completed.   -- Check CXR

## 2019-05-28 NOTE — CARE UPDATE
Pt seen and examined, and I agree with Dr. Rebolledo's assessment and plan. Will continue current care outlined in the H&P with the following additions:    U/S with ascites and cirrhosis. Consult placed to Pulm/Critical care for paracentesis given IR back up for a while and anesthesia requested paracentesis be performed prior to EGD today. Continue current care. Updated GI on status.    Addendum ():  EGD showed varices with stigmata of bleeding, but stable. Will monitor overnight in ICU. Lactulose to start given high ammonia.      MARTÍN Mcfarlane M.D.   Hospitalist   [facility]   Department of Hospital Medicine   Pager: [phone]

## 2019-05-28 NOTE — TRANSFER OF CARE
"Anesthesia Transfer of Care Note    Patient: Jeanie Malhotra    Procedure(s) Performed: Procedure(s) (LRB):  EGD (ESOPHAGOGASTRODUODENOSCOPY) (Left)    Patient location: ICU (report to Amairani sifuentes)    Anesthesia Type: general    Transport from OR: Transported from OR on 100% O2 by closed face mask with adequate spontaneous ventilation    Post pain: adequate analgesia    Post assessment: no apparent anesthetic complications    Post vital signs: stable    Level of consciousness: awake    Complications: none    Transfer of care protocol was followed      Last vitals:   Visit Vitals  /71 (BP Location: Left arm, Patient Position: Lying)   Pulse 85   Temp 36.5 °C (97.7 °F) (Oral)   Resp 16   Ht 4' 11" (1.499 m)   Wt 62.5 kg (137 lb 12.6 oz)   SpO2 98%   Breastfeeding? No   BMI 27.83 kg/m²     "

## 2019-05-28 NOTE — PLAN OF CARE
05/28/19 1041   Discharge Assessment   Assessment Type Discharge Planning Assessment   Confirmed/corrected address and phone number on facesheet? Yes   Assessment information obtained from? Patient;Caregiver;Medical Record   Expected Length of Stay (days) 2   Communicated expected length of stay with patient/caregiver no   Prior to hospitilization cognitive status: Unable to Assess   Prior to hospitalization functional status: Assistive Equipment;Needs Assistance   Current cognitive status: Alert/Oriented   Current Functional Status: Needs Assistance;Assistive Equipment   Facility Arrived From: home   Lives With spouse   Able to Return to Prior Arrangements yes   Is patient able to care for self after discharge? Yes   Who are your caregiver(s) and their phone number(s)? Spouse, Jorge A [phone]   Patient's perception of discharge disposition admitted as an inpatient   Readmission Within the Last 30 Days no previous admission in last 30 days   Patient currently being followed by outpatient case management? No   Patient currently receives any other outside agency services? No   Equipment Currently Used at Home walker, rolling;cane, straight;grab bar   Do you have any problems affording any of your prescribed medications? No   Is the patient taking medications as prescribed? yes   Does the patient have transportation home? Yes   Transportation Anticipated family or friend will provide   Dialysis Name and Scheduled days N/A   Does the patient receive services at the Coumadin Clinic? No   Discharge Plan A Home with family   Discharge Plan B Home Health   DME Needed Upon Discharge  none   Patient/Family in Agreement with Plan yes   SW to pt's room to complete initial discharge planning assessment, discuss helping the pt manage care at home, and identifying health care preferences.     MITCHELL introduced self and the care management role in discharge planning was explained to the pt.    MITCHELL's name and contact info placed  on the pts white board in their room.    The pt identified her spouse Jorge A as a person who will help at home if needed.     The pt identified their preferred pharmacy as:   [facility] [address]  [place], [place] - [address]  [address]  [place] [address]  Phone: [phone] Fax: [phone]    PCP: Jaxon Sharp MD   Pt requested SW cancel pt's scheduled appt for 5/29, SW cancelled.    During discharge planning, the SW will make efforts to schedule appointments using the pts preferred appointment time: Mid Morning    Case Management SW or TN will continue to plan for pts discharge throughout their inpatient stay.

## 2019-05-28 NOTE — PLAN OF CARE
Problem: Adult Inpatient Plan of Care  Goal: Plan of Care Review  Outcome: Ongoing (interventions implemented as appropriate)  Pt admitted overnight for GI bleed.  No s/s of bleeding since admission to ICU.  Protonix and octreotide drips infusing.  Pt received 2 units  PRBCs overnight with improvement in H/H.  Vitals stable.  Complains of generalized abdominal pain, relieved with PRN morphine.  GI consulted and plans for EGD today.  No skin breakdown or injury observed; safety precautions in place.

## 2019-05-28 NOTE — PROGRESS NOTES
1215 Paracentesis done at bedside. 1720ml removed. Fluid sent to lab. Patient tolerated well.    1300 To endoscopy via bed.  at bedside.

## 2019-05-28 NOTE — EICU
Brief new admit note:     64 y/o female with a medical history of hernia, DM, hepatitis-C, and HTN presents to the ED via EMS, accompanied by relative, for evaluation of x1 episode of bright red rectal bleeding and dark black stool with bowel movment and abdominal pain that began today. Patient c/o associated abdominal distention for x1 week. On baby ASA and no other AC. + intermittent fevers. External hemorrhoids/piles with + hem occult.     Data:  Hg 5.8, MCV low 29. Alk po4- 201. Alb 2.1,   LA 7.7, EKG: Sinus tachy, qtc 452. US leg- no DVT.  UA neg.  INR 1.1. BNP 50, troponin 0.06.     Camera:  Resting comfortably.  115/53, HR 93, sats 93 to 95%  On octreotide and protonix drip.     A/P:  1. GI bleed, mostly acute on chronic from her Liver disease from Hepatitis C/New Ascites edema, low albumin.  No fever, wbc normal.  Elevated LA from above.    - as per GI Dr Joy's advice, she is on rocephin,    octreotide/pantoprazole gtt, Reglan once and 2 units of PRBC to goal Hg 8.    - follow LA in am.    2. SCd as VTE prophylaxis.  No DVT on sonogram     Aspiration precautions.

## 2019-05-28 NOTE — [employer] PATIENT INSTRUCTIONS
Discharge Summary/Instructions after an Endoscopic Procedure  Patient Name: Jeanie Malhotra  Patient MRN: [id number]  Patient YOB: 1955  Tuesday, May 28, 2019  Jairo Joy MD  RESTRICTIONS:  During your procedure today, you received medications for sedation.  These   medications may affect your judgment, balance and coordination.  Therefore,   for 24 hours, you have the following restrictions:   - DO NOT drive a car, operate machinery, make legal/financial decisions,   sign important papers or drink alcohol.    ACTIVITY:  Today: no heavy lifting, straining or running due to procedural   sedation/anesthesia.  The following day: return to full activity including work.  DIET:  Eat and drink normally unless instructed otherwise.     TREATMENT FOR COMMON SIDE EFFECTS:  - Mild abdominal pain, nausea, belching, bloating or excessive gas:  rest,   eat lightly and use a heating pad.  - Sore Throat: treat with throat lozenges and/or gargle with warm salt   water.  - Because air was used during the procedure, expelling large amounts of air   from your rectum or belching is normal.  - If a bowel prep was taken, you may not have a bowel movement for 1-3 days.    This is normal.  SYMPTOMS TO WATCH FOR AND REPORT TO YOUR PHYSICIAN:  1. Abdominal pain or bloating, other than gas cramps.  2. Chest pain.  3. Back pain.  4. Signs of infection such as: chills or fever occurring within 24 hours   after the procedure.  5. Rectal bleeding, which would show as bright red, maroon, or black stools.   (A tablespoon of blood from the rectum is not serious, especially if   hemorrhoids are present.)  6. Vomiting.  7. Weakness or dizziness.  GO DIRECTLY TO THE NEAREST EMERGENCY ROOM IF YOU HAVE ANY OF THE FOLLOWING:      Difficulty breathing              Chills and/or fever over 101 F   Persistent vomiting and/or vomiting blood   Severe abdominal pain   Severe chest pain   Black, tarry stools   Bleeding- more than one  tablespoon   Any other symptom or condition that you feel may need urgent attention  Your doctor recommends these additional instructions:  If any biopsies were taken, your doctors clinic will contact you in 1 to 2   weeks with any results.  - Monitor H/H and transfuse only to an H/H of 8/24.  - Continue protonix and octreotide drips for 2 more days and antibiotics for   a 7 day course.  - Lactulose will be started for an elevated ammonia level.  - Return patient to ICU for ongoing care.  For questions, problems or results please call your physician - Jairo Joy MD at Work:  [phone].  [facility] can be reached at [phone]     IF A COMPLICATION OR EMERGENCY SITUATION ARISES AND YOU ARE UNABLE TO REACH   YOUR PHYSICIAN - GO DIRECTLY TO THE EMERGENCY ROOM.  Jairo Joy MD  5/28/2019 2:00:58 PM  This report has been verified and signed electronically.  [employer]

## 2019-05-29 PROBLEM — R53.81 DEBILITY: Status: ACTIVE | Noted: 2019-05-29

## 2019-05-29 PROBLEM — K92.2 GASTROINTESTINAL HEMORRHAGE: Status: ACTIVE | Noted: 2019-05-29

## 2019-05-29 PROBLEM — K25.9 MULTIPLE GASTRIC ULCERS: Status: ACTIVE | Noted: 2019-05-29

## 2019-05-29 PROBLEM — K76.82 HEPATIC ENCEPHALOPATHY: Status: ACTIVE | Noted: 2019-05-29

## 2019-05-29 PROBLEM — I85.00 ESOPHAGEAL VARICES WITHOUT BLEEDING: Status: ACTIVE | Noted: 2019-05-29

## 2019-05-29 PROBLEM — K76.82 HEPATIC ENCEPHALOPATHY: Status: RESOLVED | Noted: 2019-05-29 | Resolved: 2019-05-29

## 2019-05-29 LAB
ALBUMIN FLD-MCNC: 0.3 G/DL
ALBUMIN SERPL BCP-MCNC: 2.5 G/DL (ref 3.5–5.2)
ALP SERPL-CCNC: 139 U/L (ref 55–135)
ALT SERPL W/O P-5'-P-CCNC: 7 U/L (ref 10–44)
AMYLASE, BODY FLUID: 11 U/L
ANION GAP SERPL CALC-SCNC: 7 MMOL/L (ref 8–16)
AST SERPL-CCNC: 49 U/L (ref 10–40)
BASOPHILS # BLD AUTO: 0.02 K/UL (ref 0–0.2)
BASOPHILS # BLD AUTO: 0.02 K/UL (ref 0–0.2)
BASOPHILS NFR BLD: 0.3 % (ref 0–1.9)
BASOPHILS NFR BLD: 0.3 % (ref 0–1.9)
BILIRUB SERPL-MCNC: 0.9 MG/DL (ref 0.1–1)
BODY FLUID SOURCE AMYLASE: NORMAL
BODY FLUID SOURCE, LDH: NORMAL
BUN SERPL-MCNC: 21 MG/DL (ref 8–23)
CALCIUM SERPL-MCNC: 8 MG/DL (ref 8.7–10.5)
CHLORIDE SERPL-SCNC: 110 MMOL/L (ref 95–110)
CO2 SERPL-SCNC: 24 MMOL/L (ref 23–29)
CREAT SERPL-MCNC: 1.1 MG/DL (ref 0.5–1.4)
DIFFERENTIAL METHOD: ABNORMAL
DIFFERENTIAL METHOD: ABNORMAL
EOSINOPHIL # BLD AUTO: 0.1 K/UL (ref 0–0.5)
EOSINOPHIL # BLD AUTO: 0.1 K/UL (ref 0–0.5)
EOSINOPHIL NFR BLD: 1 % (ref 0–8)
EOSINOPHIL NFR BLD: 1 % (ref 0–8)
ERYTHROCYTE [DISTWIDTH] IN BLOOD BY AUTOMATED COUNT: 16.4 % (ref 11.5–14.5)
ERYTHROCYTE [DISTWIDTH] IN BLOOD BY AUTOMATED COUNT: 16.4 % (ref 11.5–14.5)
EST. GFR  (AFRICAN AMERICAN): >60 ML/MIN/1.73 M^2
EST. GFR  (NON AFRICAN AMERICAN): 53 ML/MIN/1.73 M^2
GLUCOSE FLD-MCNC: 150 MG/DL
GLUCOSE SERPL-MCNC: 162 MG/DL (ref 70–110)
HCT VFR BLD AUTO: 30.7 % (ref 37–48.5)
HCT VFR BLD AUTO: 30.7 % (ref 37–48.5)
HCT VFR BLD AUTO: 31.4 % (ref 37–48.5)
HCT VFR BLD AUTO: 32.5 % (ref 37–48.5)
HCT VFR BLD AUTO: 32.5 % (ref 37–48.5)
HCT VFR BLD AUTO: 32.7 % (ref 37–48.5)
HGB BLD-MCNC: 10.1 G/DL (ref 12–16)
HGB BLD-MCNC: 10.1 G/DL (ref 12–16)
LDH FLD L TO P-CCNC: 30 U/L
LYMPHOCYTES # BLD AUTO: 1.1 K/UL (ref 1–4.8)
LYMPHOCYTES # BLD AUTO: 1.1 K/UL (ref 1–4.8)
LYMPHOCYTES NFR BLD: 18.4 % (ref 18–48)
LYMPHOCYTES NFR BLD: 18.4 % (ref 18–48)
MCH RBC QN AUTO: 29 PG (ref 27–31)
MCH RBC QN AUTO: 29 PG (ref 27–31)
MCHC RBC AUTO-ENTMCNC: 31.1 G/DL (ref 32–36)
MCHC RBC AUTO-ENTMCNC: 31.1 G/DL (ref 32–36)
MCV RBC AUTO: 93 FL (ref 82–98)
MCV RBC AUTO: 93 FL (ref 82–98)
MONOCYTES # BLD AUTO: 0.6 K/UL (ref 0.3–1)
MONOCYTES # BLD AUTO: 0.6 K/UL (ref 0.3–1)
MONOCYTES NFR BLD: 9.6 % (ref 4–15)
MONOCYTES NFR BLD: 9.6 % (ref 4–15)
NEUTROPHILS # BLD AUTO: 4.2 K/UL (ref 1.8–7.7)
NEUTROPHILS # BLD AUTO: 4.2 K/UL (ref 1.8–7.7)
NEUTROPHILS NFR BLD: 70.7 % (ref 38–73)
NEUTROPHILS NFR BLD: 70.7 % (ref 38–73)
PLATELET # BLD AUTO: 179 K/UL (ref 150–350)
PLATELET # BLD AUTO: 179 K/UL (ref 150–350)
PMV BLD AUTO: 10.1 FL (ref 9.2–12.9)
PMV BLD AUTO: 10.1 FL (ref 9.2–12.9)
POCT GLUCOSE: 151 MG/DL (ref 70–110)
POCT GLUCOSE: 182 MG/DL (ref 70–110)
POCT GLUCOSE: 192 MG/DL (ref 70–110)
POCT GLUCOSE: 200 MG/DL (ref 70–110)
POCT GLUCOSE: 219 MG/DL (ref 70–110)
POTASSIUM SERPL-SCNC: 4.2 MMOL/L (ref 3.5–5.1)
PROT FLD-MCNC: <1 G/DL
PROT SERPL-MCNC: 5.9 G/DL (ref 6–8.4)
RBC # BLD AUTO: 3.48 M/UL (ref 4–5.4)
RBC # BLD AUTO: 3.48 M/UL (ref 4–5.4)
SODIUM SERPL-SCNC: 141 MMOL/L (ref 136–145)
SPECIMEN SOURCE: NORMAL
WBC # BLD AUTO: 5.93 K/UL (ref 3.9–12.7)
WBC # BLD AUTO: 5.93 K/UL (ref 3.9–12.7)

## 2019-05-29 PROCEDURE — 97165 OT EVAL LOW COMPLEX 30 MIN: CPT

## 2019-05-29 PROCEDURE — 87338 HPYLORI STOOL AG IA: CPT

## 2019-05-29 PROCEDURE — 97162 PT EVAL MOD COMPLEX 30 MIN: CPT

## 2019-05-29 PROCEDURE — 97110 THERAPEUTIC EXERCISES: CPT

## 2019-05-29 PROCEDURE — 94640 AIRWAY INHALATION TREATMENT: CPT

## 2019-05-29 PROCEDURE — 36569 INSJ PICC 5 YR+ W/O IMAGING: CPT

## 2019-05-29 PROCEDURE — 25000003 PHARM REV CODE 250: Performed by: HOSPITALIST

## 2019-05-29 PROCEDURE — 85014 HEMATOCRIT: CPT | Mod: 91

## 2019-05-29 PROCEDURE — 94761 N-INVAS EAR/PLS OXIMETRY MLT: CPT

## 2019-05-29 PROCEDURE — C9113 INJ PANTOPRAZOLE SODIUM, VIA: HCPCS | Performed by: HOSPITALIST

## 2019-05-29 PROCEDURE — 36415 COLL VENOUS BLD VENIPUNCTURE: CPT

## 2019-05-29 PROCEDURE — 21400001 HC TELEMETRY ROOM

## 2019-05-29 PROCEDURE — 63600175 PHARM REV CODE 636 W HCPCS: Performed by: HOSPITALIST

## 2019-05-29 PROCEDURE — 25000003 PHARM REV CODE 250: Performed by: INTERNAL MEDICINE

## 2019-05-29 PROCEDURE — 85025 COMPLETE CBC W/AUTO DIFF WBC: CPT

## 2019-05-29 PROCEDURE — 80053 COMPREHEN METABOLIC PANEL: CPT

## 2019-05-29 RX ORDER — SODIUM CHLORIDE 0.9 % (FLUSH) 0.9 %
10 SYRINGE (ML) INJECTION EVERY 6 HOURS
Status: DISCONTINUED | OUTPATIENT
Start: 2019-05-30 | End: 2019-06-02 | Stop reason: HOSPADM

## 2019-05-29 RX ORDER — SPIRONOLACTONE 25 MG/1
100 TABLET ORAL DAILY
Status: DISCONTINUED | OUTPATIENT
Start: 2019-05-29 | End: 2019-06-02 | Stop reason: HOSPADM

## 2019-05-29 RX ORDER — MORPHINE SULFATE 10 MG/ML
2 INJECTION INTRAMUSCULAR; INTRAVENOUS; SUBCUTANEOUS EVERY 6 HOURS PRN
Status: DISCONTINUED | OUTPATIENT
Start: 2019-05-29 | End: 2019-06-02 | Stop reason: HOSPADM

## 2019-05-29 RX ORDER — SODIUM CHLORIDE 0.9 % (FLUSH) 0.9 %
10 SYRINGE (ML) INJECTION
Status: DISCONTINUED | OUTPATIENT
Start: 2019-05-29 | End: 2019-06-02 | Stop reason: HOSPADM

## 2019-05-29 RX ORDER — FUROSEMIDE 40 MG/1
40 TABLET ORAL DAILY
Status: DISCONTINUED | OUTPATIENT
Start: 2019-05-29 | End: 2019-06-02

## 2019-05-29 RX ADMIN — AMITRIPTYLINE HYDROCHLORIDE 25 MG: 25 TABLET, FILM COATED ORAL at 09:05

## 2019-05-29 RX ADMIN — FLUTICASONE FUROATE AND VILANTEROL TRIFENATATE 1 PUFF: 200; 25 POWDER RESPIRATORY (INHALATION) at 09:05

## 2019-05-29 RX ADMIN — LEVETIRACETAM 500 MG: 500 TABLET ORAL at 09:05

## 2019-05-29 RX ADMIN — LEVETIRACETAM 500 MG: 500 TABLET ORAL at 08:05

## 2019-05-29 RX ADMIN — OCTREOTIDE ACETATE 50 MCG/HR: 500 INJECTION, SOLUTION INTRAVENOUS; SUBCUTANEOUS at 09:05

## 2019-05-29 RX ADMIN — INSULIN ASPART 2 UNITS: 100 INJECTION, SOLUTION INTRAVENOUS; SUBCUTANEOUS at 04:05

## 2019-05-29 RX ADMIN — PANTOPRAZOLE SODIUM 8 MG/HR: 40 INJECTION, POWDER, FOR SOLUTION INTRAVENOUS at 08:05

## 2019-05-29 RX ADMIN — PANTOPRAZOLE SODIUM 8 MG/HR: 40 INJECTION, POWDER, FOR SOLUTION INTRAVENOUS at 07:05

## 2019-05-29 RX ADMIN — CARBIDOPA AND LEVODOPA 2 TABLET: 25; 100 TABLET, EXTENDED RELEASE ORAL at 09:05

## 2019-05-29 RX ADMIN — OCTREOTIDE ACETATE 50 MCG/HR: 500 INJECTION, SOLUTION INTRAVENOUS; SUBCUTANEOUS at 05:05

## 2019-05-29 RX ADMIN — LACTULOSE 30 G: 20 SOLUTION ORAL at 04:05

## 2019-05-29 RX ADMIN — FUROSEMIDE 40 MG: 40 TABLET ORAL at 08:05

## 2019-05-29 RX ADMIN — SPIRONOLACTONE 100 MG: 25 TABLET ORAL at 08:05

## 2019-05-29 RX ADMIN — RIFAXIMIN 200 MG: 200 TABLET ORAL at 10:05

## 2019-05-29 RX ADMIN — LACTULOSE 30 G: 20 SOLUTION ORAL at 08:05

## 2019-05-29 RX ADMIN — INSULIN ASPART 2 UNITS: 100 INJECTION, SOLUTION INTRAVENOUS; SUBCUTANEOUS at 06:05

## 2019-05-29 RX ADMIN — PANTOPRAZOLE SODIUM 8 MG/HR: 40 INJECTION, POWDER, FOR SOLUTION INTRAVENOUS at 03:05

## 2019-05-29 NOTE — PLAN OF CARE
Problem: Adult Inpatient Plan of Care  Goal: Plan of Care Review  Outcome: Ongoing (interventions implemented as appropriate)  Pt on room air alert and oriented Xs 4. Protonix and Octreotide infusing. NS on monitor, VSS, afebrile. A new 20 G IV started per house supervisor with ultrasound. Pt voiding per bedpan. No BM this shift. Accu checks Q 6. PRN morphine given at beginning of shift for abdominal pain. Pt turned per self. No falls, injuries, or skin breakdown this shift.

## 2019-05-29 NOTE — PLAN OF CARE
Problem: Adult Inpatient Plan of Care  Goal: Plan of Care Review  Outcome: Ongoing (interventions implemented as appropriate)     05/29/19 1757   Plan of Care Review   Plan of Care Reviewed With patient;spouse;daughter       Problem: Diabetes Comorbidity  Goal: Blood Glucose Level Within Desired Range  Outcome: Ongoing (interventions implemented as appropriate)  Intervention: Maintain Glycemic Control     05/29/19 1757   Monitor and Manage Ketoacidosis   Glycemic Management blood glucose monitoring         Problem: Skin Injury Risk Increased  Goal: Skin Health and Integrity  Outcome: Ongoing (interventions implemented as appropriate)  Intervention: Optimize Skin Protection     05/29/19 1757   Prevent Additional Skin Injury   Head of Bed (HOB) HOB at 30-45 degrees   Pressure Reduction Devices pressure-redistributing mattress utilized   Pressure Reduction Techniques frequent weight shift encouraged

## 2019-05-29 NOTE — PROCEDURES
"Jeanie Malhotra is a 63 y.o. female patient.    Temp: 98.1 °F (36.7 °C) (05/29/19 1632)  Pulse: 104 (05/29/19 1632)  Resp: 18 (05/29/19 1632)  BP: 134/71 (05/29/19 1632)  SpO2: 95 % (05/29/19 1632)  Weight: 62.5 kg (137 lb 12.6 oz) (05/28/19 0715)  Height: 4' 11" (149.9 cm) (05/28/19 0715)    PICC  Date/Time: 5/29/2019 6:15 PM  Performed by: Shahram Pretty RN  Consent Done: Yes  Time out: Immediately prior to procedure a time out was called to verify the correct patient, procedure, equipment, support staff and site/side marked as required  Indications: med administration and vascular access  Anesthesia: local infiltration  Local anesthetic: lidocaine 1% without epinephrine  Anesthetic Total (mL): 2  Preparation: skin prepped with ChloraPrep  Skin prep agent dried: skin prep agent completely dried prior to procedure  Sterile barriers: all five maximum sterile barriers used - cap, mask, sterile gown, sterile gloves, and large sterile sheet  Hand hygiene: hand hygiene performed prior to central venous catheter insertion  Location details: right basilic  Catheter type: double lumen  Catheter size: 5 Fr  Catheter Length: 32cm    Ultrasound guidance: yes  Vessel Caliber: medium, compressibility normal  Needle advanced into vessel with real time Ultrasound guidance.  Guidewire confirmed in vessel.  Sterile sheath used.  Number of attempts: 1  Post-procedure: blood return through all ports, chlorhexidine patch and sterile dressing applied            Shahram Pretty  5/29/2019    "

## 2019-05-29 NOTE — PROGRESS NOTES
The patient did not have any bleeding overnight.  She had a very small bowel movement.    Vitals:    05/29/19 0300 05/29/19 0315 05/29/19 0400 05/29/19 0500   BP: 128/69  138/63 119/60   Pulse: 86  88 83   Resp: 11  14 (!) 23   Temp:  98.4 °F (36.9 °C)     TempSrc:  Oral     SpO2: 95%  98% 96%   Weight:       Height:          amitriptyline  25 mg Oral QHS    carbidopa-levodopa  mg  2 tablet Oral Nightly    cefTRIAXone (ROCEPHIN) IVPB  1 g Intravenous Q24H    fluticasone furoate-vilanterol  1 puff Inhalation Daily    lactulose  30 g Oral TID    levETIRAcetam  500 mg Oral BID    lidocaine HCL 10 mg/ml (1%)  5 mL Intradermal Once     P.E.:  GEN: A x O x 3, NAD  HEENT: EOMI, PERRL, anicteric sclera  CV: RRR, no M/R/G  Chest: CTA B  Abdomen: soft, NTND, normoactive BS  Ext: No C/C/E. 2+ dorsalis pedis pulses B    Labs:  Recent Results (from the past 336 hour(s))   CBC auto differential    Collection Time: 05/28/19  5:38 AM   Result Value Ref Range    WBC 4.97 3.90 - 12.70 K/uL    Hemoglobin 9.6 (L) 12.0 - 16.0 g/dL    Hematocrit 31.0 (L) 37.0 - 48.5 %    Platelets 164 150 - 350 K/uL   CBC auto differential    Collection Time: 05/27/19  6:07 PM   Result Value Ref Range    WBC 4.70 3.90 - 12.70 K/uL    Hemoglobin 5.8 (LL) 12.0 - 16.0 g/dL    Hematocrit 19.8 (LL) 37.0 - 48.5 %    Platelets 166 150 - 350 K/uL     CMP  Sodium   Date Value Ref Range Status   05/28/2019 142 136 - 145 mmol/L Final     Potassium   Date Value Ref Range Status   05/28/2019 5.0 3.5 - 5.1 mmol/L Final     Chloride   Date Value Ref Range Status   05/28/2019 112 (H) 95 - 110 mmol/L Final     CO2   Date Value Ref Range Status   05/28/2019 21 (L) 23 - 29 mmol/L Final     Glucose   Date Value Ref Range Status   05/28/2019 148 (H) 70 - 110 mg/dL Final     BUN, Bld   Date Value Ref Range Status   05/28/2019 19 8 - 23 mg/dL Final     Creatinine   Date Value Ref Range Status   05/28/2019 0.9 0.5 - 1.4 mg/dL Final     Calcium   Date Value Ref  Range Status   05/28/2019 7.9 (L) 8.7 - 10.5 mg/dL Final     Total Protein   Date Value Ref Range Status   05/28/2019 5.9 (L) 6.0 - 8.4 g/dL Final     Albumin   Date Value Ref Range Status   05/28/2019 2.5 (L) 3.5 - 5.2 g/dL Final     Total Bilirubin   Date Value Ref Range Status   05/28/2019 1.0 0.1 - 1.0 mg/dL Final     Comment:     For infants and newborns, interpretation of results should be based  on gestational age, weight and in agreement with clinical  observations.  Premature Infant recommended reference ranges:  Up to 24 hours.............<8.0 mg/dL  Up to 48 hours............<12.0 mg/dL  3-5 days..................<15.0 mg/dL  6-29 days.................<15.0 mg/dL       Alkaline Phosphatase   Date Value Ref Range Status   05/28/2019 178 (H) 55 - 135 U/L Final     AST   Date Value Ref Range Status   05/28/2019 50 (H) 10 - 40 U/L Final     ALT   Date Value Ref Range Status   05/28/2019 17 10 - 44 U/L Final     Anion Gap   Date Value Ref Range Status   05/28/2019 9 8 - 16 mmol/L Final     eGFR if    Date Value Ref Range Status   05/28/2019 >60 >60 mL/min/1.73 m^2 Final     eGFR if non    Date Value Ref Range Status   05/28/2019 >60 >60 mL/min/1.73 m^2 Final     Comment:     Calculation used to obtain the estimated glomerular filtration  rate (eGFR) is the CKD-EPI equation.        Recent Labs   Lab 05/28/19  1120   INR 1.3*       Ultrasound:    1. Echogenic liver with nodularity to the surface of the liver with findings suggestive of antegrade flow in the portal vein, likely representing changes from cirrhosis of the liver.  Clinical correlation suggested.  2. Borderline splenomegaly.  3. Cholelithiasis.  4. Gallbladder wall edema or pericholecystic fluid collection either from cholecystitis or may be related to ascites.  5. Four-quadrant ascites.    A/P:  The patient is a 63 year old woman with a history of HTN, DM, GERD, and recently diagnosed hepatitis C presenting with a GI  bleed.   1.  GI Bleed - the patient underwent an EGD in which she had a grade I varix, portal hypertensive gastropathy, and two gastric cardia ulcers, one of which appeared to have bled as it had an overlying clot.  These were injected with epinephrine.  As they were seen on full retroflexion, cautery and hemoclip placement were unable to be performed. A Helicobacter pylori stool antigen test will be checked.  NSAID cessation was emphasized.  She can remain on the protonix and octreotide drips for one more day then protonix Q12H.  Antibiotics can be continued for a 7 day course.  An EGD can be repeated in 2 months to ensure ulcer healing.  Lactulose can be continued to achieve 4 bowel movements daily.  She is oriented.  A paracentesis was performed and she does not have spontaneous bacterial peritonitis.  A low salt, low fluid diet was emphasized.  Lasix and spironolactone will be started.  She will be monitored.  SHE DOES NOT WISH FOR HER FAMILY TO KNOW HER DIAGNOSIS OF HEPATITIS C.    etiology of her bleeding is unclear.  She may have cirrhosis from hepatitis C.  An ultrasound will be obtained.  If she has ascites, a therapeutic paracentesis with fluid studies can be obtained.  She was transfused 2 units of pRBCs with an over correction in her H/H.  The protonix drip, octreotide drip, and ceftriaxone can be continued.  She can undergo an EGD.  If she no longer bleeds, she can undergo an outpatient screening colonoscopy.

## 2019-05-29 NOTE — PLAN OF CARE
Problem: Physical Therapy Goal  Goal: Physical Therapy Goal  Goals to be met by: 19     Patient will increase functional independence with mobility by performin. Supine to sit with Modified [place]  2. Rolling to Left and Right with Modified [place]  3. Sit to stand transfer with Modified [place] using RW  4. Bed to chair transfer with Modified [place] using Rolling Walker  5. Gait x200 feet with Modified [place] using Rolling Walker  6. Lower extremity exercise program 3 sets x10 reps per handout, with independence    Pt ambulated ~50 ft x 2 trials with CGA using RW and chair to follow.  Pt with minimal unsteadiness during ambulation.

## 2019-05-29 NOTE — PT/OT/SLP EVAL
Physical Therapy Evaluation    Patient Name:  Jeanie Malhotra   MRN:  [id number]    Recommendations:     Discharge Recommendations:  home health PT(with family assistance/supervision)   Discharge Equipment Recommendations: tub bench   Barriers to discharge: None    Assessment:     Jeanie Malhotra is a 63 y.o. female admitted with a medical diagnosis of Acute upper GI bleeding.  She presents with the following impairments/functional limitations:  weakness, impaired endurance, impaired self care skills, impaired functional mobilty, gait instability, impaired balance, impaired cognition, decreased coordination, decreased lower extremity function, decreased safety awareness, pain.    Rehab Prognosis: Fair+; patient would benefit from acute skilled PT services to address these deficits and reach maximum level of function.    Recent Surgery: Procedure(s) (LRB):  EGD (ESOPHAGOGASTRODUODENOSCOPY) (Left) 1 Day Post-Op    Plan:     During this hospitalization, patient to be seen 5 x/week(M-F) to address the identified rehab impairments via gait training, therapeutic activities, therapeutic exercises and progress toward the following goals:    · Plan of Care Expires:  06/12/19    Subjective     Chief Complaint: weakness  Patient/Family Comments/goals: to get well   Pain/Comfort:  · Pain Rating 1: (Pt c/o stomach pain.)    Living Environment:  Pt lives with spouse in a SS house with ~3-4 steps and L HR at entry.  Prior to admission, patients level of function was mod I with ambulation using RW.  Pt does not drive.  Equipment used at home: walker, rolling, rollator, grab bar, cane, straight.  Upon discharge, patient will have assistance from spouse.    Objective:     Communicated with nurse prior to session.  Patient found HOB elevated with peripheral IV, telemetry  upon PT entry to room.    General Precautions: Standard, fall   Orthopedic Precautions:N/A   Braces: N/A     Exams:  · Cognitive Exam:  Patient is oriented to Person and  Place.  Pt able to follow 2 step commands.  Pt mumbling, sometimes difficult to understand.    · Gross Motor Coordination:  impaired 2* weakness  · Postural Exam:  Patient presented with the following abnormalities:    · -       Rounded shoulders  · Skin Integrity/Edema:      · -       Skin integrity: Visible skin intact  · -       Edema: None noted BLE  · BLE ROM: WFL  · BLE Strength: WFL    Functional Mobility:  · Bed Mobility:     · Scooting: stand by assistance  · Supine to Sit: stand by assistance  · Transfers:     · Sit to Stand:  contact guard assistance with rolling walker  · Bed to Chair: contact guard assistance with  rolling walker  using  Step Transfer  · Gait: Pt ambulated ~50 ft x 2 trials with min A-CGA using RW and chair to follow.  Pt with minimal unsteadiness and LOB upon initial standing.  Pt with slight increased B hip ER, narrow CHUCK, decreased step length, and decreased major.     · Balance: Pt with fair dynamic standing balance.       Therapeutic Activities and Exercises:  BLE seated therex 10 reps: hip flex, LAQ, and AP    AM-PAC 6 CLICK MOBILITY  Total Score:21     Patient left up in chair with all lines intact, call button in reach and nurse and PCT notified.  Lunch tray in front.      GOALS:   Multidisciplinary Problems     Physical Therapy Goals        Problem: Physical Therapy Goal    Goal Priority Disciplines Outcome Goal Variances Interventions   Physical Therapy Goal     PT, PT/OT      Description:  Goals to be met by: 19     Patient will increase functional independence with mobility by performin. Supine to sit with Modified [place]  2. Rolling to Left and Right with Modified [place]  3. Sit to stand transfer with Modified [place] using RW  4. Bed to chair transfer with Modified [place] using Rolling Walker  5. Gait x200 feet with Modified [place] using Rolling Walker  6. Lower extremity exercise program 3 sets x10 reps per handout, with  independence                      History:     Past Medical History:   Diagnosis Date    Diabetes mellitus     GERD (gastroesophageal reflux disease)     Hypertension        Past Surgical History:   Procedure Laterality Date    ABDOMINAL SURGERY      BREAST CYST EXCISION         Time Tracking:     PT Received On: 05/29/19  PT Start Time: 1349     PT Stop Time: 1414  PT Total Time (min): 25 min     Billable Minutes: Evaluation 17 min co-eval with OT and Therapeutic Exercise 8 min      Tricia U Isaac, PT  05/29/2019

## 2019-05-29 NOTE — PLAN OF CARE
Problem: Occupational Therapy Goal  Goal: Occupational Therapy Goal  Goals to be met by: 6/12/2019     Patient will increase functional independence with ADLs by performing:    Feeding with [place].  UE Dressing with Modified [place].  LE Dressing with Modified [place].  Grooming while seated with Modified [place].  Toileting from toilet with Set-up Assistance for hygiene and clothing management.   Supine to sit with Modified [place].  Step transfer with Stand-by Assistance  Toilet transfer to toilet with Contact Guard Assistance.  Upper extremity exercise program with assistance as needed.    Outcome: Ongoing (interventions implemented as appropriate)  Patient tolerated evaluation well, good participation.  Patient will benefit from skilled OT services to address the above mentioned goals. MARIAJOSE Small, MS

## 2019-05-29 NOTE — PROGRESS NOTES
Patient transferred to telemetry via bed with monitor in place.  has belongings and is at bedside.

## 2019-05-29 NOTE — PT/OT/SLP EVAL
"Occupational Therapy   Evaluation    Name: Jeanie Malhotra  MRN: [id number]  Admitting Diagnosis:  Acute upper GI bleeding 1 Day Post-Op    Recommendations:     Discharge Recommendations: home with home health  Discharge Equipment Recommendations:  tub bench  Barriers to discharge:  None    Assessment:     Jeanie Malhotra is a 63 y.o. female with a medical diagnosis of Acute upper GI bleeding.  She presents with  independence for self-care and functional transfers. Performance deficits affecting function: weakness, impaired endurance, impaired functional mobilty, impaired self care skills, impaired cognition, decreased upper extremity function, decreased safety awareness, pain, decreased ROM.      Rehab Prognosis: Good; patient would benefit from acute skilled OT services to address these deficits and reach maximum level of function.       Plan:     Patient to be seen 3 x/week to address the above listed problems via self-care/home management, therapeutic activities, therapeutic exercises  · Plan of Care Expires: 19  · Plan of Care Reviewed with: patient    Subjective     Chief Complaint: "I get help to get out of the tub."  Patient/Family Comments/goals: to get better    Occupational Profile:  Living Environment: lives with her ; 3-4 KRIS  Previous level of function: modified independent  Roles and Routines: does not drive  Equipment Used at Home:  walker, rolling, rollator, grab bar, cane, straight  Assistance upon Discharge: from     Pain/Comfort:  · Pain Rating 1: other (see comments)(Patient c/o stomach pain but unable to rate)    Patients cultural, spiritual, Rastafarian conflicts given the current situation: no    Objective:     Communicated with: nursing prior to session.  Patient found supine with telemetry, peripheral IV upon OT entry to room.    General Precautions: Standard, fall   Orthopedic Precautions:N/A   Braces: N/A     Occupational Performance:    Bed Mobility:    · Patient " completed Rolling/Turning to Right with contact guard assistance  · Patient completed Scooting/Bridging with contact guard assistance  · Patient completed Supine to Sit with contact guard assistance    Functional Mobility/Transfers:  · Patient completed Sit <> Stand Transfer with minimum assistance  with  rolling walker   · Functional Mobility: ambulated in the ivey with a RW and CGA with PT    Activities of Daily Living:  · Feeding:  modified independence    · Grooming: modified independence    · Upper Body Dressing: minimum assistance    · Lower Body Dressing: moderate assistance      Cognitive/Visual Perceptual:  Cognitive/Psychosocial Skills:     -       Oriented to: Person and Situation   -       Follows Commands/attention:Easily distracted and Follows one-step commands  -       Communication: dysarthria and secondary to decreased dentition  -       Memory: Poor immediate recall  -       Safety awareness/insight to disability: impaired   -       Mood/Affect/Coping skills/emotional control: Appropriate to situation    Physical Exam:  Balance:    -       sit balance fair plus; standing balance fair  Postural examination/scapula alignment:    -       Rounded shoulders  Skin integrity: Bruising of B UE  Upper Extremity Range of Motion:     -       Right Upper Extremity: WFL  -       Left Upper Extremity: WFL  Upper Extremity Strength:    -       Right Upper Extremity: WFL  -       Left Upper Extremity: WFL    AMPAC 6 Click ADL:  AMPAC Total Score: 17    Treatment & Education:  Evaluation   Education:    Patient left up in chair with all lines intact, call button in reach and nurse Della and ANUP notified    GOALS:   Multidisciplinary Problems     Occupational Therapy Goals        Problem: Occupational Therapy Goal    Goal Priority Disciplines Outcome Interventions   Occupational Therapy Goal     OT, PT/OT Ongoing (interventions implemented as appropriate)    Description:  Goals to be met by: 6/12/2019     Patient  will increase functional independence with ADLs by performing:    Feeding with [place].  UE Dressing with Modified [place].  LE Dressing with Modified [place].  Grooming while seated with Modified [place].  Toileting from toilet with Set-up Assistance for hygiene and clothing management.   Supine to sit with Modified [place].  Step transfer with Stand-by Assistance  Toilet transfer to toilet with Contact Guard Assistance.  Upper extremity exercise program with assistance as needed.                      History:     Past Medical History:   Diagnosis Date    Diabetes mellitus     GERD (gastroesophageal reflux disease)     Hypertension        Past Surgical History:   Procedure Laterality Date    ABDOMINAL SURGERY      BREAST CYST EXCISION         Time Tracking:     OT Date of Treatment: 05/29/19  OT Start Time: 1350  OT Stop Time: 1415  OT Total Time (min): 25 min    Billable Minutes:Evaluation 25 minutes with PT    MARIAJOSE Small, MS  5/29/2019

## 2019-05-30 LAB
ALBUMIN SERPL BCP-MCNC: 2.3 G/DL (ref 3.5–5.2)
ALP SERPL-CCNC: 123 U/L (ref 55–135)
ALT SERPL W/O P-5'-P-CCNC: <5 U/L (ref 10–44)
AMMONIA PLAS-SCNC: 36 UMOL/L (ref 10–50)
ANION GAP SERPL CALC-SCNC: 6 MMOL/L (ref 8–16)
AST SERPL-CCNC: 41 U/L (ref 10–40)
BASOPHILS # BLD AUTO: 0.01 K/UL (ref 0–0.2)
BASOPHILS NFR BLD: 0.2 % (ref 0–1.9)
BILIRUB SERPL-MCNC: 1 MG/DL (ref 0.1–1)
BUN SERPL-MCNC: 18 MG/DL (ref 8–23)
CALCIUM SERPL-MCNC: 7.6 MG/DL (ref 8.7–10.5)
CHLORIDE SERPL-SCNC: 108 MMOL/L (ref 95–110)
CO2 SERPL-SCNC: 24 MMOL/L (ref 23–29)
CREAT SERPL-MCNC: 1.2 MG/DL (ref 0.5–1.4)
DIFFERENTIAL METHOD: ABNORMAL
EOSINOPHIL # BLD AUTO: 0 K/UL (ref 0–0.5)
EOSINOPHIL NFR BLD: 0.6 % (ref 0–8)
ERYTHROCYTE [DISTWIDTH] IN BLOOD BY AUTOMATED COUNT: 16 % (ref 11.5–14.5)
EST. GFR  (AFRICAN AMERICAN): 55 ML/MIN/1.73 M^2
EST. GFR  (NON AFRICAN AMERICAN): 48 ML/MIN/1.73 M^2
GLUCOSE SERPL-MCNC: 163 MG/DL (ref 70–110)
HCT VFR BLD AUTO: 27.1 % (ref 37–48.5)
HGB BLD-MCNC: 8.5 G/DL (ref 12–16)
LYMPHOCYTES # BLD AUTO: 0.7 K/UL (ref 1–4.8)
LYMPHOCYTES NFR BLD: 13.3 % (ref 18–48)
MCH RBC QN AUTO: 28.7 PG (ref 27–31)
MCHC RBC AUTO-ENTMCNC: 31.4 G/DL (ref 32–36)
MCV RBC AUTO: 92 FL (ref 82–98)
MONOCYTES # BLD AUTO: 0.8 K/UL (ref 0.3–1)
MONOCYTES NFR BLD: 15.1 % (ref 4–15)
NEUTROPHILS # BLD AUTO: 3.6 K/UL (ref 1.8–7.7)
NEUTROPHILS NFR BLD: 70.8 % (ref 38–73)
PLATELET # BLD AUTO: 165 K/UL (ref 150–350)
PMV BLD AUTO: 10.1 FL (ref 9.2–12.9)
POCT GLUCOSE: 136 MG/DL (ref 70–110)
POCT GLUCOSE: 171 MG/DL (ref 70–110)
POCT GLUCOSE: 190 MG/DL (ref 70–110)
POCT GLUCOSE: 191 MG/DL (ref 70–110)
POCT GLUCOSE: 224 MG/DL (ref 70–110)
POTASSIUM SERPL-SCNC: 3.3 MMOL/L (ref 3.5–5.1)
PROT SERPL-MCNC: 5.5 G/DL (ref 6–8.4)
RBC # BLD AUTO: 2.96 M/UL (ref 4–5.4)
SODIUM SERPL-SCNC: 138 MMOL/L (ref 136–145)
WBC # BLD AUTO: 5.03 K/UL (ref 3.9–12.7)

## 2019-05-30 PROCEDURE — C9113 INJ PANTOPRAZOLE SODIUM, VIA: HCPCS | Performed by: HOSPITALIST

## 2019-05-30 PROCEDURE — 21400001 HC TELEMETRY ROOM

## 2019-05-30 PROCEDURE — 82140 ASSAY OF AMMONIA: CPT

## 2019-05-30 PROCEDURE — 25000003 PHARM REV CODE 250: Performed by: HOSPITALIST

## 2019-05-30 PROCEDURE — 97535 SELF CARE MNGMENT TRAINING: CPT

## 2019-05-30 PROCEDURE — A4216 STERILE WATER/SALINE, 10 ML: HCPCS | Performed by: HOSPITALIST

## 2019-05-30 PROCEDURE — 94640 AIRWAY INHALATION TREATMENT: CPT

## 2019-05-30 PROCEDURE — 97116 GAIT TRAINING THERAPY: CPT

## 2019-05-30 PROCEDURE — 97110 THERAPEUTIC EXERCISES: CPT

## 2019-05-30 PROCEDURE — 80053 COMPREHEN METABOLIC PANEL: CPT

## 2019-05-30 PROCEDURE — 63600175 PHARM REV CODE 636 W HCPCS: Performed by: HOSPITALIST

## 2019-05-30 PROCEDURE — 94760 N-INVAS EAR/PLS OXIMETRY 1: CPT

## 2019-05-30 PROCEDURE — 85025 COMPLETE CBC W/AUTO DIFF WBC: CPT

## 2019-05-30 PROCEDURE — 94761 N-INVAS EAR/PLS OXIMETRY MLT: CPT

## 2019-05-30 RX ORDER — PANTOPRAZOLE SODIUM 40 MG/1
40 TABLET, DELAYED RELEASE ORAL 2 TIMES DAILY
Status: DISCONTINUED | OUTPATIENT
Start: 2019-05-30 | End: 2019-06-02 | Stop reason: HOSPADM

## 2019-05-30 RX ORDER — LACTULOSE 10 G/15ML
30 SOLUTION ORAL 2 TIMES DAILY
Status: DISCONTINUED | OUTPATIENT
Start: 2019-05-30 | End: 2019-06-02 | Stop reason: HOSPADM

## 2019-05-30 RX ORDER — QUETIAPINE FUMARATE 25 MG/1
25 TABLET, FILM COATED ORAL NIGHTLY
Status: DISCONTINUED | OUTPATIENT
Start: 2019-05-30 | End: 2019-06-02 | Stop reason: HOSPADM

## 2019-05-30 RX ADMIN — FLUTICASONE FUROATE AND VILANTEROL TRIFENATATE 1 PUFF: 200; 25 POWDER RESPIRATORY (INHALATION) at 09:05

## 2019-05-30 RX ADMIN — QUETIAPINE FUMARATE 25 MG: 25 TABLET ORAL at 09:05

## 2019-05-30 RX ADMIN — Medication 10 ML: at 11:05

## 2019-05-30 RX ADMIN — CEFTRIAXONE 1 G: 1 INJECTION, SOLUTION INTRAVENOUS at 12:05

## 2019-05-30 RX ADMIN — LEVETIRACETAM 500 MG: 500 TABLET ORAL at 08:05

## 2019-05-30 RX ADMIN — INSULIN ASPART 4 UNITS: 100 INJECTION, SOLUTION INTRAVENOUS; SUBCUTANEOUS at 05:05

## 2019-05-30 RX ADMIN — AMITRIPTYLINE HYDROCHLORIDE 25 MG: 25 TABLET, FILM COATED ORAL at 09:05

## 2019-05-30 RX ADMIN — CEFTRIAXONE 1 G: 1 INJECTION, SOLUTION INTRAVENOUS at 11:05

## 2019-05-30 RX ADMIN — LEVETIRACETAM 500 MG: 500 TABLET ORAL at 09:05

## 2019-05-30 RX ADMIN — CARBIDOPA AND LEVODOPA 2 TABLET: 25; 100 TABLET, EXTENDED RELEASE ORAL at 09:05

## 2019-05-30 RX ADMIN — PANTOPRAZOLE SODIUM 8 MG/HR: 40 INJECTION, POWDER, FOR SOLUTION INTRAVENOUS at 01:05

## 2019-05-30 RX ADMIN — PANTOPRAZOLE SODIUM 40 MG: 40 TABLET, DELAYED RELEASE ORAL at 09:05

## 2019-05-30 RX ADMIN — LACTULOSE 30 G: 20 SOLUTION ORAL at 08:05

## 2019-05-30 RX ADMIN — Medication 10 ML: at 06:05

## 2019-05-30 RX ADMIN — Medication 10 ML: at 12:05

## 2019-05-30 RX ADMIN — OCTREOTIDE ACETATE 50 MCG/HR: 500 INJECTION, SOLUTION INTRAVENOUS; SUBCUTANEOUS at 06:05

## 2019-05-30 RX ADMIN — MORPHINE SULFATE 2 MG: 10 INJECTION INTRAVENOUS at 04:05

## 2019-05-30 RX ADMIN — PANTOPRAZOLE SODIUM 8 MG/HR: 40 INJECTION, POWDER, FOR SOLUTION INTRAVENOUS at 06:05

## 2019-05-30 RX ADMIN — FUROSEMIDE 40 MG: 40 TABLET ORAL at 08:05

## 2019-05-30 RX ADMIN — PANTOPRAZOLE SODIUM 8 MG/HR: 40 INJECTION, POWDER, FOR SOLUTION INTRAVENOUS at 12:05

## 2019-05-30 RX ADMIN — RIFAXIMIN 200 MG: 200 TABLET ORAL at 08:05

## 2019-05-30 RX ADMIN — SPIRONOLACTONE 100 MG: 25 TABLET ORAL at 08:05

## 2019-05-30 RX ADMIN — RIFAXIMIN 200 MG: 200 TABLET ORAL at 09:05

## 2019-05-30 NOTE — ASSESSMENT & PLAN NOTE
Hep C positive, treatment naive and genotype pending  Will need outpatient follow up with GI for management and possible treatment.

## 2019-05-30 NOTE — PLAN OF CARE
Problem: Occupational Therapy Goal  Goal: Occupational Therapy Goal  Goals to be met by: 6/12/2019     Patient will increase functional independence with ADLs by performing:    Feeding with [place].  UE Dressing with Modified [place].  LE Dressing with Modified [place].  Grooming while seated with Modified [place].  Toileting from toilet with Set-up Assistance for hygiene and clothing management.   Supine to sit with Modified [place].  Step transfer with Stand-by Assistance  Toilet transfer to toilet with Contact Guard Assistance. Met 5/30  Upper extremity exercise program with assistance as needed.     Outcome: Ongoing (interventions implemented as appropriate)  Patient able to ambulate to bathroom with CGA/RW today. Patient required max redirection and frequent verbal/tactile cues during OOB mobility/therapy tasks secondary to cognition and decreased safety awareness. Patient will benefit from continued OT to address functional deficits.

## 2019-05-30 NOTE — PT/OT/SLP PROGRESS
"Physical Therapy Treatment    Patient Name:  Jeanie Malhotra   MRN:  [id number]    Recommendations:     Discharge Recommendations:  home health PT   Discharge Equipment Recommendations: tub bench   Barriers to discharge: None    Assessment:     Jeanie Malhotra is a 63 y.o. female admitted with a medical diagnosis of Acute upper GI bleeding.  She presents with the following impairments/functional limitations:  weakness, impaired endurance, impaired self care skills, impaired functional mobilty, gait instability, impaired balance, impaired cognition, decreased coordination, decreased lower extremity function, decreased upper extremity function, decreased safety awareness, decreased ROM, impaired coordination.  Pt ambulated 100ft with RW, CGA displaying decreased coordination.  Pt requires assistance for all OOB mobility 2/2 cognition and safety awareness.    Rehab Prognosis: Fair; patient would benefit from acute skilled PT services to address these deficits and reach maximum level of function.    Recent Surgery: Procedure(s) (LRB):  EGD (ESOPHAGOGASTRODUODENOSCOPY) (Left) 2 Days Post-Op    Plan:     During this hospitalization, patient to be seen 5 x/week(M-F) to address the identified rehab impairments via gait training, therapeutic activities, therapeutic exercises and progress toward the following goals:    · Plan of Care Expires:  06/12/19    Subjective     Chief Complaint: not wanting to wear gait belt, however, pt was able to calm down and ambulate with belt on  Patient/Family Comments/goals: Pt states " They told me I can't get up."  Pain/Comfort:  · Pain Rating 1: 0/10      Objective:     Communicated with pt's nurse, Della, prior to session.  Patient found HOB elevated with telemetry, peripheral IV upon PT entry to room.     General Precautions: Standard, fall(confused)   Orthopedic Precautions:N/A   Braces:       Functional Mobility:  · Bed Mobility:     · Scooting: supervision  · Supine to Sit: " supervision  · Sit to Supine: supervision  · Transfers:from EOB    · Sit to Stand:  contact guard assistance with no AD  · Gait: Pt ambualted ~100ft with RW, CGA with decreased coordination and safety awareness.  Requires min A for RW steering.  Pt with decreased major, safety awareness, step length, major, endurance, velocity of limb and postural control.  · Balance: good sitting and fair standing      AM-PAC 6 CLICK MOBILITY  Turning over in bed (including adjusting bedclothes, sheets and blankets)?: 4  Sitting down on and standing up from a chair with arms (e.g., wheelchair, bedside commode, etc.): 4  Moving from lying on back to sitting on the side of the bed?: 4  Moving to and from a bed to a chair (including a wheelchair)?: 3  Need to walk in hospital room?: 3  Climbing 3-5 steps with a railing?: 3  Basic Mobility Total Score: 21       Therapeutic Activities and Exercises:   Pt performed seated therex to BLEs x15 reps AROM including: hip flexion, LAQs, heel raises, toe raises, hip abduction/adduction    Patient left left sidelying with all lines intact, call button in reach, bed alarm on and pt's nurse, Della, notified..    GOALS:   Multidisciplinary Problems     Physical Therapy Goals        Problem: Physical Therapy Goal    Goal Priority Disciplines Outcome Goal Variances Interventions   Physical Therapy Goal     PT, PT/OT      Description:  Goals to be met by: 19     Patient will increase functional independence with mobility by performin. Supine to sit with Modified [place]  2. Rolling to Left and Right with Modified [place]  3. Sit to stand transfer with Modified [place] using RW  4. Bed to chair transfer with Modified [place] using Rolling Walker  5. Gait x200 feet with Modified [place] using Rolling Walker  6. Lower extremity exercise program 3 sets x10 reps per handout, with independence                      Time Tracking:     PT Received On: 19  PT Start  Time: 1425     PT Stop Time: 1449  PT Total Time (min): 24 min     Billable Minutes: Gait Training 14 and Therapeutic Exercise 10    Treatment Type: Treatment  PT/PTA: PTA     PTA Visit Number: 1     Susana Malhotra PTA  05/30/2019

## 2019-05-30 NOTE — PHYSICIAN QUERY
"PT Name: Jeanie Malhotra  MR #: [id number]     PHYSICIAN QUERY -  ACUITY OF CONDITION CLARIFICATION      Juliette Nicole RN, CCDS  Desk # [phone]; lynn # [phone] [email or web address]    This form is a permanent document in the medical record.     Query Date: May 30, 2019    By submitting this query, we are merely seeking further clarification of documentation to reflect the severity of illness of your patient. Please utilize your independent clinical judgment when addressing the question(s) below.    The Medical record reflects the following:     Indicators  Supporting Clinical Findings Location in Medical Record   x Documentation of condition Hepatic encephalopathy   Metabolic Encephalopathy    confused for past 2 weeks  GI PN 5/29  EICU CC MD 5/28    GI PN 5/30   x Lab Value(s) Ammonia:  135 on 5/28                      36 on 5/30 Lab   x Radiology Findings U/S with evidence of PH and underlying cirrhosis  Pulm CN   x Treatment                                 Medication Lactulose to start given high ammonia    Lactulose can be reduced but patient should have 3 to 4 loose   stools daily.      If the mental confusion does not resolve then a neurological    evaluation may need to be done. Hosp MD Care Update 5/28      GI PN 5/30   x Other SBP no concern   Significant ascites  Symptomatic anemia            Acute UGIB likely d/t esophageal varices 2/2 liver cirrhosis           Anemia due to cirrhosis of the liver and acute GIB    Sob, unexpected wt change (decreased 9 lbs)  Bibasilar rales H&P              Pulm CN     Provider, please specify the acuity/chronicity of 'Hepatic encephalopathy":    [ x  ] Acute   [   ] Chronic   [   ] Acute and/on chronic   [   ]  Clinically Undetermined     Please document in your progress notes daily for the duration of treatment until resolved, and include in your discharge summary.  "

## 2019-05-30 NOTE — NURSING
Patient repeatedly trying to get out of bed due to frequent BMs. Had to constantly redirect and contacted daughter, Mary because patient believed staff was conspiring against her. Patient believed TV remote was recording her and the computer monitors were taking pictures of her. Telesitter now in place. Will continue to monitor.

## 2019-05-30 NOTE — NURSING
Report received from CLIFF Hull. Patient awake and alert. NAD noted, family at bedside. Safety maintained. Will continue to monitor.

## 2019-05-30 NOTE — PHYSICIAN QUERY
"PT Name: Jeanie Malhotra  MR #: [id number]     PHYSICIAN QUERY -  ACUITY OF CONDITION CLARIFICATION      Juliette Nicole RN, CCDS  Desk # [phone]; lynn # [phone] [email or web address]    This form is a permanent document in the medical record.     Query Date: May 30, 2019    By submitting this query, we are merely seeking further clarification of documentation to reflect the severity of illness of your patient. Please utilize your independent clinical judgment when addressing the question(s) below.    The Medical record reflects the following:     Indicators   Supporting Clinical Findings Location in Medical Record   x Documentation of condition wo gastric cardia ulcers GI PN 5/29   x Lab Value(s) PRBC x2   H&H: 5.8/19.8 on 5/27 Blood Bank 5/28  Lab    Radiology Findings     x Treatment                                 Medication A Helicobacter pylori stool antigen test will be checked.    NSAID cessation was emphasized.      She can remain on the protonix and octreotide drips for one more day then protonix Q12H.      Antibiotics can be continued for a 7 day course.    An EGD can be repeated in 2 months to ensure ulcer healing.      EGD in which she had a grade I varix, portal hypertensive gastropathy, and two gastric cardia ulcers, one of which appeared to have bled as it had an overlying clot.  These were injected with epinephrine.  As they were seen on full retroflexion, cautery and hemoclip placement were unable to be performed.   GI PN 5/29   x Other Acute UGIB likely d/t esophageal varices 2/2 liver cirrhosis   Anemia due to cirrhosis of the liver and acute GIB  Symptomatic anemia - Secondary to acute blood loss eICU [facility] 5/28     Provider, please specify the acuity/chronicity of  "Gastric Ulcer"    [   ] Acute   [   ] Chronic   [   ] Acute and/on chronic   [ x  ]  Clinically Undetermined     Please document in your progress notes daily for the duration of treatment until resolved, and include in your " discharge summary.

## 2019-05-30 NOTE — ASSESSMENT & PLAN NOTE
Initial H/H of 5.8/19.8 with underlying cirrhosis secondary to hepatitis C.   Pt was empirically transfused 2 U PRBC  Started on PPI and octreotide gtt.  S/p paracentesis with removal of 1720 mL of ascitic fluid.  EGD on 5/28 showed non-bleeding grade I esophageal varices. Two non-bleeding gastric ulcers with adherent clot. Injected. Due to the location of the ulcers seen only on complete retroflexion, cautery and hemoclip placement were unable to be performed. Portal hypertensive gastropathy.  H/H increased to 9.6/31 post transfusion.   Ascitic fluid negative for SBP and consistent with portal HTN.   U/S showed cirrhosis   Pt also with some cognitive impairment due to hepatic encephalopathy with ammonia level of 135    Lactulose and Rifaximin started. Patient is also on low Na diet and on lasix and spironolactone.  Much improved ammonia, but patient still with confusion.  Symptoms possibly related to acute delirium from hospitalization.  Delirium precautions.  Will try Seroquel tonight.  Some drop in H/H, but no further bleeding.  Continue to monitor.  Stop drips and change to oral PPI.  PT/OT evaluation.

## 2019-05-30 NOTE — ASSESSMENT & PLAN NOTE
Initial H/H of 5.8/19.8 with underlying cirrhosis secondary to hepatitis C.   Pt was empirically transfused 2 U PRBC  Started on PPI and octreotide gtt, and Rocephin for prophylaxis.   S/p paracentesis with removal of 1720 mL of ascitic fluid prior to patient undergoing EGD by GI on 5/28/19.   EGD on 5/28 showed non-bleeding grade I esophageal varices. Two non-bleeding gastric ulcers with adherent clot. Injected. Due to the location of the ulcers seen only on complete retroflexion, cautery and hemoclip placement were unable to be performed. Portal hypertensive gastropathy. Normal examined duodenum.   H/H increased to 9.6/31.0 which was higher than expected likely reflecting previous level was higher than reported given persistently high levels that remained stable post transfusion.   Ascitic fluid negative for SBP and consistent with portal HTN.   U/S showed cirrhosis   Pt also with some cognitive impairment due to hepatic encephalopathy with ammonia level of 135    Lactulose started on 5/28.   Patient is also on low Na diet and on lasix and spironolactone  H/H stable post transfusion and no further bleeding  PPI and octreotide gtt to continue for 1 more day, stop tomorrow  Convert PPI to PO tomorrow  Stable for transfer to floor today and start PT/OT  Will add rifaximen today and repeat ammonia level in am

## 2019-05-30 NOTE — SUBJECTIVE & OBJECTIVE
Interval History: No complaints. Confused and agitated overnight per nursing.    Review of Systems   HENT: Negative for ear discharge and ear pain.    Eyes: Negative for pain and itching.   Endocrine: Negative for polyphagia and polyuria.   Neurological: Negative for seizures and syncope.     Objective:     Vital Signs (Most Recent):  Temp: 99.2 °F (37.3 °C) (05/30/19 1209)  Pulse: 96 (05/30/19 1209)  Resp: 16 (05/30/19 1209)  BP: 128/60 (05/30/19 1209)  SpO2: 98 % (05/30/19 1209) Vital Signs (24h Range):  Temp:  [98.1 °F (36.7 °C)-99.2 °F (37.3 °C)] 99.2 °F (37.3 °C)  Pulse:  [] 96  Resp:  [16-18] 16  SpO2:  [95 %-99 %] 98 %  BP: (122-149)/(60-75) 128/60     Weight: 62.5 kg (137 lb 12.6 oz)  Body mass index is 27.83 kg/m².  No intake or output data in the 24 hours ending 05/30/19 1400   Physical Exam   Constitutional: No distress.   Older than stated age    HENT:   Head: Normocephalic and atraumatic.   Right Ear: External ear normal.   Mouth/Throat: No oropharyngeal exudate.   Eyes: Pupils are equal, round, and reactive to light. Conjunctivae and EOM are normal. Right eye exhibits no discharge. Left eye exhibits no discharge. No scleral icterus.   No scleral icterus    Neck: Normal range of motion. No JVD present. No tracheal deviation present. No thyromegaly present.   Cardiovascular: Normal rate, regular rhythm and normal heart sounds. Exam reveals no friction rub.   No murmur heard.  Pulmonary/Chest: Effort normal. No respiratory distress. She has no wheezes.   Decreased breath sounds at bases, R > L   Abdominal: Soft. Bowel sounds are normal. She exhibits no mass. There is no tenderness. There is no rebound and no guarding. No hernia.   + ascites   Musculoskeletal: Normal range of motion. She exhibits edema. She exhibits no tenderness or deformity.   Trace edema to LE bilaterally   Lymphadenopathy:     She has no cervical adenopathy.   Neurological: She is alert. No sensory deficit. She exhibits normal  muscle tone. Coordination normal.   Slow to answer but mostly appropriate, mild confusion.   Skin: Skin is warm and dry. Capillary refill takes less than 2 seconds. No rash noted. She is not diaphoretic. No erythema. No pallor.   Psychiatric: Cognition and memory are impaired.   Nursing note and vitals reviewed.      Significant Labs:   BMP:   Recent Labs   Lab 05/30/19  0737   *      K 3.3*      CO2 24   BUN 18   CREATININE 1.2   CALCIUM 7.6*     CBC:   Recent Labs   Lab 05/29/19  0626  05/29/19  1038 05/29/19  1744 05/30/19  0737   WBC 5.93  5.93  --   --   --  5.03   HGB 10.1*  10.1*  --   --   --  8.5*   HCT 32.5*  32.5*   < > 30.7* 31.4* 27.1*     179  --   --   --  165    < > = values in this interval not displayed.

## 2019-05-30 NOTE — PROGRESS NOTES
[facility] Hospital Medicine  Progress Note    Patient Name: Jeanie Malhotra  MRN: [id number]  Patient Class: IP- Inpatient   Admission Date: 5/27/2019  Length of Stay: 2 days  Attending Physician: Marily Mcfarlane MD  Primary Care Provider: Jaxon Sharp MD        Subjective:     Principal Problem:Acute upper GI bleeding    HPI:  Jeanie Malhotra is a 63 y.o. female that (in part)  has a past medical history of Diabetes mellitus, GERD (gastroesophageal reflux disease), and Hypertension.  has a past surgical history that includes Breast cyst excision and Abdominal surgery. Presents to [facility] Emergency Department complaining of rectal bleeding which started earlier today.  She has had increasing abdominal distension over the last week with progressive worsening over the weekend.  She was previously undergoing evaluation for liver dysfunction and was diagnosed with hepatitis C and February of this year.  However, she lost her insurance and has not been able to follow-up as recommended.  Denies fever chills.  Positive for confusion.  Patient is not the best historian due to suspected metabolic encephalopathy.  She has had dyspnea with exertion and shortness of breath at rest.  Increasing skin pallor, easy bruising, and loose stools.  Positive for melena and hematochezia.  Patient has history of abdominal surgery occurring over 10 years ago but cannot specify exactly what was performed.  History is otherwise unreliable in limited at this time.    In the emergency department routine laboratory studies were obtained and rectal exam was performed with evidence of Hemoccult-positive stool.  She had a significantly decreased hemoglobin of 5.8 and elevated lactic acid level.    Hospital medicine has been asked to admit for further evaluation and treatment.     Hospital Course:  Ms. Malhotra was admitted with an UGIB with an initial H/H of 5.8/19.8. She was recently diagnosed with cirrhosis  secondary to hepatitis C. Pt was empirically transfused 2 U PRBC and was started on PPI and octreotide gtt, and Rocephin for prophylaxis. Pulmonary performed paracentesis with removal of 1720 mL of ascitic fluid prior to patient undergoing EGD by GI on 5/28/19. EGD showed non-bleeding grade I esophageal varices. Two non-bleeding gastric ulcers with adherent clot. Injected. Due to the location of the ulcers seen only on complete retroflexion, cautery and hemoclip placement were unable to be performed. Portal hypertensive gastropathy. Normal examined duodenum. Patient returned to ICU for continued monitoring after procedure. H/H increased to 9.6/31.0 which was higher than expected likely reflecting previous level was higher than reported given persistently high levels that remained stable post transfusion. Ascitic fluid negative for SBP and consistent with portal HTN. Pt also with some cognitive impairment due to hepatic encephalopathy with ammonia level of 135  Lactulose started on 5/28. Patient is also on low Na diet along with lasix and spironolactone for her ascites.    Interval History: Pt feeling better, abdominal area/girth decreased. No reported bleeding. No acute events overnight.    Review of Systems   Constitutional: Negative for chills and fever.   Respiratory: Negative for shortness of breath.    Cardiovascular: Negative for chest pain.   Psychiatric/Behavioral: Positive for confusion.     Objective:     Vital Signs (Most Recent):  Temp: 99.1 °F (37.3 °C) (05/29/19 2025)  Pulse: 101 (05/29/19 2025)  Resp: 18 (05/29/19 2025)  BP: (!) 142/75 (05/29/19 2025)  SpO2: 98 % (05/29/19 2025) Vital Signs (24h Range):  Temp:  [97.7 °F (36.5 °C)-99.1 °F (37.3 °C)] 99.1 °F (37.3 °C)  Pulse:  [] 101  Resp:  [11-40] 18  SpO2:  [91 %-100 %] 98 %  BP: (110-142)/(60-75) 142/75     Weight: 62.5 kg (137 lb 12.6 oz)  Body mass index is 27.83 kg/m².    Intake/Output Summary (Last 24 hours) at 5/29/2019 2127  Last data  filed at 5/29/2019 1000  Gross per 24 hour   Intake 680 ml   Output 175 ml   Net 505 ml      Physical Exam   Constitutional: No distress.   Older than stated age    HENT:   Head: Normocephalic and atraumatic.   Right Ear: External ear normal.   Mouth/Throat: No oropharyngeal exudate.   Eyes: Pupils are equal, round, and reactive to light. Conjunctivae and EOM are normal. Right eye exhibits no discharge. Left eye exhibits no discharge. No scleral icterus.   No scleral icterus    Neck: Normal range of motion. No JVD present. No tracheal deviation present. No thyromegaly present.   Cardiovascular: Normal rate, regular rhythm and normal heart sounds. Exam reveals no friction rub.   No murmur heard.  Pulmonary/Chest: Effort normal. No respiratory distress. She has no wheezes.   Decreased breath sounds at bases, R > L   Abdominal: Soft. Bowel sounds are normal. She exhibits distension. She exhibits no mass. There is no tenderness. There is no rebound and no guarding. No hernia.   + ascites   Musculoskeletal: Normal range of motion. She exhibits edema. She exhibits no tenderness or deformity.   Trace edema to LE bilaterally   Lymphadenopathy:     She has no cervical adenopathy.   Neurological: She is alert. No sensory deficit. She exhibits normal muscle tone. Coordination normal.   Slow to answer but mostly appropriate, mild confusion, oriented x 2 (not quite to time)   Skin: Skin is warm and dry. Capillary refill takes less than 2 seconds. No rash noted. She is not diaphoretic. No erythema. No pallor.   Psychiatric: She has a normal mood and affect. Her behavior is normal. Cognition and memory are impaired.   Nursing note and vitals reviewed.      Significant Labs: All pertinent labs within the past 24 hours have been reviewed.    Significant Imaging: I have reviewed and interpreted all pertinent imaging results/findings within the past 24 hours.    Assessment/Plan:      * Acute upper GI bleeding  Initial H/H of 5.8/19.8  with underlying cirrhosis secondary to hepatitis C.   Pt was empirically transfused 2 U PRBC  Started on PPI and octreotide gtt, and Rocephin for prophylaxis.   S/p paracentesis with removal of 1720 mL of ascitic fluid prior to patient undergoing EGD by GI on 5/28/19.   EGD on 5/28 showed non-bleeding grade I esophageal varices. Two non-bleeding gastric ulcers with adherent clot. Injected. Due to the location of the ulcers seen only on complete retroflexion, cautery and hemoclip placement were unable to be performed. Portal hypertensive gastropathy. Normal examined duodenum.   H/H increased to 9.6/31.0 which was higher than expected likely reflecting previous level was higher than reported given persistently high levels that remained stable post transfusion.   Ascitic fluid negative for SBP and consistent with portal HTN.   U/S showed cirrhosis   Pt also with some cognitive impairment due to hepatic encephalopathy with ammonia level of 135    Lactulose started on 5/28.   Patient is also on low Na diet and on lasix and spironolactone  H/H stable post transfusion and no further bleeding  PPI and octreotide gtt to continue for 1 more day, stop tomorrow  Convert PPI to PO tomorrow  Stable for transfer to floor today and start PT/OT  Will add rifaximen today and repeat ammonia level in am    Multiple gastric ulcers  Seen on EGD  As discussed above  Willl need 1 more day of PPI gtt and convert to PO tomorrow    Esophageal varices without bleeding  Seen on EGD  As discussed above    Hepatic encephalopathy  Ammonia level was high at 135  Lactulose started on 5/28  Mentation improved but still slow, will add rifaximen  Will check repeat ammonia level in am    Cirrhosis of liver with ascites  As expected and seen on U/S  S/p paracentesis and on appropriate mediations as discussed above    Ascites  S/p paracentesis  Negative for SBP  Continue Low Na diet with lasix and spironolactone    Debility  PT/OT to evaluate and  treat    Hepatitis C  Hep C positive, treatment naive and genotype pending  Will need outpatient follow up with GI for management and possible treatment     Symptomatic anemia  As discussed above under GIB    Diabetes mellitus, type 2  HgbA1C 6.3  Glucose control with SSI and diet   Will continue to monitor      VTE Risk Mitigation (From admission, onward)        Ordered     Place sequential compression device  Until discontinued      05/28/19 0146     IP VTE LOW RISK PATIENT  Once      05/27/19 2350          Critical care time spent on the evaluation and treatment of severe organ dysfunction, review of pertinent labs and imaging studies, discussions with consulting providers and discussions with patient/family: 60 minutes.     Marily Mcfarlane MD  Department of Hospital Medicine   [facility]

## 2019-05-30 NOTE — ASSESSMENT & PLAN NOTE
Ammonia level was high at 135  Lactulose started on 5/28  Mentation improved but still slow, will add rifaximen  Will check repeat ammonia level in am

## 2019-05-30 NOTE — ASSESSMENT & PLAN NOTE
Hep C positive, treatment naive and genotype pending  Will need outpatient follow up with GI for management and possible treatment

## 2019-05-30 NOTE — PHYSICIAN QUERY
"PT Name: Jeanie Malhotra  MR #: [id number]     Physician Query Form - Etiology of Condition Clarification      Juliette Nicole RN, CCDS  Desk # [phone]; lynn # [phone] [email or web address]    This form is a permanent document in the medical record.     Query Date: May 30, 2019    By submitting this query, we are merely seeking further clarification of documentation.  Please utilize your independent clinical judgment when addressing the question(s) below.     The medical record contains the following:    Findings Supporting Clinical Information Location in Medical Record   Acute Upper  GI Bleeding    --- per H&P    GIB mostly acute on chronic from Liver dz from hep C/new ascites    Symptomatic anemia     Acute UGIB likely d/t esophageal varices 2/2 liver cirrhosis    Anemia due to cirrhosis of the liver and acute GIB    GIB - etiology of her bleeding Is unclear  U/S with evidence of PH and underlying cirrhosis   Symptomatic anemia - Secondary to acute blood loss     EGD revealed esophageal varices, portal hypertensive gastropathy and gastric ulcers.  No active bleeding is reported.   - need to have repeat EGD to confirm gastric ulcer healing.  - Lactulose can be reduced but patient should have 3 to 4 loose stools daily.    - If the mental confusion does not resolve then a neurological evaluation may need to be done. eICU Crit Care MD        H&P          GI CN 5/28              GI PN 5/30         Please document your best medical opinion regarding the etiology of  "Acute Upper GI Bleeding"  for which treatment is/was directed.     Provider use only    (  ) Esophageal varices    (  ) Portal hypertensive gastropathy    (  ) Gastric ulcers    (  ) Other etiology - specify: _____________     [ x ]  Clinically Undetermined     Please document in your progress notes daily for the duration of treatment, until resolved, and include in your discharge summary.                                                                 "

## 2019-05-30 NOTE — PLAN OF CARE
Problem: Physical Therapy Goal  Goal: Physical Therapy Goal  Goals to be met by: 19     Patient will increase functional independence with mobility by performin. Supine to sit with Modified [place]  2. Rolling to Left and Right with Modified [place]  3. Sit to stand transfer with Modified [place] using RW  4. Bed to chair transfer with Modified [place] using Rolling Walker  5. Gait x200 feet with Modified [place] using Rolling Walker  6. Lower extremity exercise program 3 sets x10 reps per handout, with independence     Outcome: Ongoing (interventions implemented as appropriate)   Pt ambulated 100ft with RW, CGA displaying decreased coordination.  Pt requires assistance for all OOB mobility 2/2 cognition and safety awareness.

## 2019-05-30 NOTE — PROGRESS NOTES
[facility]  Gastroenterology  Progress Note    Patient Name: Jeanie Malhotra  MRN: [id number]  Admission Date: 5/27/2019  Hospital Length of Stay: 3 days  Code Status: Full Code   Attending Provider: Tuan Barnes MD  Primary Care Physician: Jaxon Sharp MD  Principal Problem: Acute upper GI bleeding    Subjective:     CC= GI Bleeding and mental confusion    Interval History:  Patient's condition discussed with her  and daughter on phone.  Patient started getting confused for the past few weeks.  No abdominal pain but her abdomen was distended.  She has had about 1700 ml paracentasis. No SBP.  Her EGD revealed esophageal varices, portal hypertensive gastropathy and gastric ulcers.  No active bleeding is reported.     Review of systems:  General: Negative for fevers, chills.  Cardiovascular:  Negative for chest pain, shortness of breath   CNS: Mental confusion per her daughter and .    Objective:     Vital Signs (Most Recent):  Temp: 99.2 °F (37.3 °C) (05/30/19 1209)  Pulse: 96 (05/30/19 1209)  Resp: 16 (05/30/19 1209)  BP: 128/60 (05/30/19 1209)  SpO2: 98 % (05/30/19 1209) Vital Signs (24h Range):  Temp:  [98.1 °F (36.7 °C)-99.2 °F (37.3 °C)] 99.2 °F (37.3 °C)  Pulse:  [] 96  Resp:  [16-18] 16  SpO2:  [95 %-99 %] 98 %  BP: (122-149)/(60-75) 128/60     Physical examination:  GEN: Well developed, well nourished in no apparent distress   HENT: Normocephalic, anicteric sclera   Cardiovascular: Regular rate and rhythm. No murmurs appreciated.   Chest: Non-labored respirations. Breath sounds equal   Abdomen: distended.  Psych: Appropriate mood and affect.   Extermities: No C/C/E. 2+ dorsalis pedis pulses bilaterally  Skin: No new visible or palpable lesions.     CBC:   Recent Labs   Lab 05/29/19  0626  05/29/19  1038 05/29/19  1744 05/30/19  0737   WBC 5.93  5.93  --   --   --  5.03   HGB 10.1*  10.1*  --   --   --  8.5*   HCT 32.5*  32.5*   < > 30.7* 31.4* 27.1*      179  --   --   --  165    < > = values in this interval not displayed.       Ultrasound of abdomen showed cirrhosis of liver. NO focal defect in the liver.    Ammonia is normal now    Assessment:   1. GI bleeding  2. Cirrhosis of liver  3. Hepatitis C  4. Hepatic encephalopathy  5. HBP/DM/GERD    Plan:     May stop Octreotide drip and change protonix to 40 mg bid by mouth.  Change diet to 2 gram sodium diet and 1200 ml total fluid restriction.  Patient will need to be followed on out patient basis for management and follow up of hepatitis C and cirrhosis of liver.  She will need to have repeat EGD to confirm gastric ulcer healing.  Lactulose can be reduced but patient should have 3 to 4 loose stools daily.  If the mental confusion does not resolve then a neurological evaluation may need to be done.      Isrrael Ley MD  Gastroenterology  [facility]

## 2019-05-30 NOTE — PROVIDER TRANSFER
62 y/o F admitted with an UGIB with an initial H/H of 5.8/19.8 and with cirrhosis secondary to hepatitis C. Pt was empirically transfused 2 U PRBC and was started on PPI and octreotide gtt, and Rocephin for prophylaxis. Pulmonary performed paracentesis with removal of 1720 mL of ascitic fluid prior to patient undergoing EGD by GI on 5/28/19. EGD showed non-bleeding grade I esophageal varices. Two non-bleeding gastric ulcers with adherent clot. Injected. Due to the location of the ulcers seen only on complete retroflexion, cautery and hemoclip placement were unable to be performed. Portal hypertensive gastropathy. Normal examined duodenum. Patient returned to ICU for continued monitoring after procedure. H/H increased to 9.6/31.0 which was higher than expected likely reflecting previous level was higher than reported given persistently high levels that remained stable post transfusion. Ascitic fluid negative for SBP and consistent with portal HTN. Pt also with some cognitive impairment due to hepatic encephalopathy with ammonia level of 135  Lactulose started on 5/28. Patient is also on low Na diet along with lasix and spironolactone for her ascites. Rifaximen added today, will need 1 more day of PPI and octreotide gtt, and convert to PO PPI tomorrow. F/u on ammonia level. PT/OT and  for discharge planning. If H/H stable and ammonia level better, possible discharge tomorrow with GI follow up, but may need 1-2 days depending on clinical course.    MARTÍN Mcfarlane MD

## 2019-05-30 NOTE — PT/OT/SLP PROGRESS
Occupational Therapy   Treatment    Name: Jeanie Malhotra  MRN: [id number]  Admitting Diagnosis:  Acute upper GI bleeding  2 Days Post-Op    Recommendations:     Discharge Recommendations: home with home health  Discharge Equipment Recommendations:  tub bench  Barriers to discharge:       Assessment:     Jeanie Malhotra is a 63 y.o. female with a medical diagnosis of Acute upper GI bleeding.  She presents with the following performance deficits affecting function: weakness, impaired endurance, impaired self care skills, impaired functional mobilty, impaired balance, impaired cognition, decreased upper extremity function, decreased lower extremity function, decreased coordination, gait instability, decreased safety awareness, decreased ROM. Patient able to ambulate to bathroom with CGA/RW today. Patient required max redirection and frequent verbal/tactile cues during OOB mobility/therapy tasks secondary to cognition and decreased safety awareness.      Rehab Prognosis:  Good; patient would benefit from acute skilled OT services to address these deficits and reach maximum level of function.       Plan:     Patient to be seen 3 x/week to address the above listed problems via self-care/home management, therapeutic activities, therapeutic exercises  · Plan of Care Expires: 06/12/19  · Plan of Care Reviewed with: patient    Subjective   Patient agreeable to therapy.   Pain/Comfort:  · Pain Rating 1: 0/10    Objective:     Communicated with: Nurse Hull  prior to session.  Patient found HOB elevated with telemetry, peripheral IV upon OT entry to room.    General Precautions: Standard, fall   Orthopedic Precautions:N/A   Braces: N/A     Occupational Performance:     Bed Mobility:    · Patient completed Scooting/Bridging with stand by assistance  · Patient completed Supine to Sit with stand by assistance     Functional Mobility/Transfers:  · Patient completed Sit <> Stand Transfer with stand by assistance and contact guard  assistance  with  rolling walker and no AD (patient impulsive and stands abruptly)   · Patient completed Toilet Transfer Step Transfer technique with contact guard assistance with  rolling walker  · Functional Mobility: Patient able to ambulate to bathroom, sink, and chair with CGA/RW. Patient required frequent redirection and verbal/tactile cues for safety awareness and walker management with all OOB mobility.     Activities of Daily Living:  · Grooming: stand by assistance for hand hygiene ater toileting standing at sink  · Upper Body Dressing: stand by assistance and contact guard assistance to doff/don back gown  · Lower Body Dressing: stand by assistance to doff/don socks long sitting in bed  · Toileting: Patient able to perform pericare with set up assistance on toilet, minimum assistance for clothing management and to doff/don brief in standing      [facility] 6 Click ADL: 18    Treatment & Education:  Patient performed bed mobility, functional transfers, and ADL's as above.   Patient and family educated on need for OOB>chair for all meals. Both verbalize understanding.   Patient educated on safety awareness with all OOB mobility. Patient will need reinforcement secondary to cognition.   Patient educated on and performed BUE AROM therex in all available planes x 10 reps with max verbal cues to attend to task. Patient encouraged to perform therex throughout day. Patient verbalizes understanding.    Patient left up in chair on pressure relief cushion with all lines intact, call button in reach, nurse notified notified and nurse, spouse, and AVASYS presentEducation:  . Patient and spouse instructed to call for nursing assist before patient OOC>bed. Both verbalize understanding.     GOALS:   Multidisciplinary Problems     Occupational Therapy Goals        Problem: Occupational Therapy Goal    Goal Priority Disciplines Outcome Interventions   Occupational Therapy Goal     OT, PT/OT Ongoing (interventions implemented as  appropriate)    Description:  Goals to be met by: 6/12/2019     Patient will increase functional independence with ADLs by performing:    Feeding with [place].  UE Dressing with Modified [place].  LE Dressing with Modified [place].  Grooming while seated with Modified [place].  Toileting from toilet with Set-up Assistance for hygiene and clothing management.   Supine to sit with Modified [place].  Step transfer with Stand-by Assistance  Toilet transfer to toilet with Contact Guard Assistance.  Upper extremity exercise program with assistance as needed.                      Time Tracking:     OT Date of Treatment: 05/30/19  OT Start Time: 1133  OT Stop Time: 1203  OT Total Time (min): 30 min    Billable Minutes:Self Care/Home Management 20  Therapeutic Exercise 10    ANA Garcia  5/30/2019

## 2019-05-30 NOTE — SUBJECTIVE & OBJECTIVE
Interval History: Pt feeling better, abdominal area/girth decreased. No reported bleeding. No acute events overnight.    Review of Systems   Constitutional: Negative for chills and fever.   Respiratory: Negative for shortness of breath.    Cardiovascular: Negative for chest pain.   Psychiatric/Behavioral: Positive for confusion.     Objective:     Vital Signs (Most Recent):  Temp: 99.1 °F (37.3 °C) (05/29/19 2025)  Pulse: 101 (05/29/19 2025)  Resp: 18 (05/29/19 2025)  BP: (!) 142/75 (05/29/19 2025)  SpO2: 98 % (05/29/19 2025) Vital Signs (24h Range):  Temp:  [97.7 °F (36.5 °C)-99.1 °F (37.3 °C)] 99.1 °F (37.3 °C)  Pulse:  [] 101  Resp:  [11-40] 18  SpO2:  [91 %-100 %] 98 %  BP: (110-142)/(60-75) 142/75     Weight: 62.5 kg (137 lb 12.6 oz)  Body mass index is 27.83 kg/m².    Intake/Output Summary (Last 24 hours) at 5/29/2019 2127  Last data filed at 5/29/2019 1000  Gross per 24 hour   Intake 680 ml   Output 175 ml   Net 505 ml      Physical Exam   Constitutional: No distress.   Older than stated age    HENT:   Head: Normocephalic and atraumatic.   Right Ear: External ear normal.   Mouth/Throat: No oropharyngeal exudate.   Eyes: Pupils are equal, round, and reactive to light. Conjunctivae and EOM are normal. Right eye exhibits no discharge. Left eye exhibits no discharge. No scleral icterus.   No scleral icterus    Neck: Normal range of motion. No JVD present. No tracheal deviation present. No thyromegaly present.   Cardiovascular: Normal rate, regular rhythm and normal heart sounds. Exam reveals no friction rub.   No murmur heard.  Pulmonary/Chest: Effort normal. No respiratory distress. She has no wheezes.   Decreased breath sounds at bases, R > L   Abdominal: Soft. Bowel sounds are normal. She exhibits distension. She exhibits no mass. There is no tenderness. There is no rebound and no guarding. No hernia.   + ascites   Musculoskeletal: Normal range of motion. She exhibits edema. She exhibits no tenderness or  deformity.   Trace edema to LE bilaterally   Lymphadenopathy:     She has no cervical adenopathy.   Neurological: She is alert. No sensory deficit. She exhibits normal muscle tone. Coordination normal.   Slow to answer but mostly appropriate, mild confusion, oriented x 2 (not quite to time)   Skin: Skin is warm and dry. Capillary refill takes less than 2 seconds. No rash noted. She is not diaphoretic. No erythema. No pallor.   Psychiatric: She has a normal mood and affect. Her behavior is normal. Cognition and memory are impaired.   Nursing note and vitals reviewed.      Significant Labs: All pertinent labs within the past 24 hours have been reviewed.    Significant Imaging: I have reviewed and interpreted all pertinent imaging results/findings within the past 24 hours.

## 2019-05-30 NOTE — PLAN OF CARE
Problem: Adult Inpatient Plan of Care  Goal: Plan of Care Review  Outcome: Ongoing (interventions implemented as appropriate)     05/30/19 1542   Plan of Care Review   Plan of Care Reviewed With patient;daughter;spouse       Problem: Diabetes Comorbidity  Goal: Blood Glucose Level Within Desired Range  Outcome: Ongoing (interventions implemented as appropriate)  Intervention: Maintain Glycemic Control     05/30/19 1542   Monitor and Manage Ketoacidosis   Glycemic Management blood glucose monitoring         Problem: Skin Injury Risk Increased  Goal: Skin Health and Integrity  Outcome: Ongoing (interventions implemented as appropriate)  Intervention: Optimize Skin Protection     05/30/19 1542   Prevent Additional Skin Injury   Head of Bed (HOB) HOB elevated   Pressure Reduction Devices pressure-redistributing mattress utilized   Pressure Reduction Techniques frequent weight shift encouraged   Monitor and Manage Hypervolemia   Skin Protection incontinence pads utilized         Problem: Infection  Goal: Infection Symptom Resolution  Outcome: Ongoing (interventions implemented as appropriate)  Intervention: Prevent or Manage Infection     05/30/19 1542   Prevent or Manage Infection   Fever Reduction/Comfort Measures lightweight bedding

## 2019-05-30 NOTE — PROGRESS NOTES
[facility] Hospital Medicine  Progress Note    Patient Name: Jeanie Malhotra  MRN: [id number]  Patient Class: IP- Inpatient   Admission Date: 5/27/2019  Length of Stay: 3 days  Attending Physician: Tuan Barnes MD  Primary Care Provider: Jaxon Sharp MD        Subjective:     Principal Problem:Acute upper GI bleeding    HPI:  Jeanie Malhotra is a 63 y.o. female that (in part)  has a past medical history of Diabetes mellitus, GERD (gastroesophageal reflux disease), and Hypertension.  has a past surgical history that includes Breast cyst excision and Abdominal surgery. Presents to [facility] Emergency Department complaining of rectal bleeding which started earlier today.  She has had increasing abdominal distension over the last week with progressive worsening over the weekend.  She was previously undergoing evaluation for liver dysfunction and was diagnosed with hepatitis C and February of this year.  However, she lost her insurance and has not been able to follow-up as recommended.  Denies fever chills.  Positive for confusion.  Patient is not the best historian due to suspected metabolic encephalopathy.  She has had dyspnea with exertion and shortness of breath at rest.  Increasing skin pallor, easy bruising, and loose stools.  Positive for melena and hematochezia.  Patient has history of abdominal surgery occurring over 10 years ago but cannot specify exactly what was performed.  History is otherwise unreliable in limited at this time.    In the emergency department routine laboratory studies were obtained and rectal exam was performed with evidence of Hemoccult-positive stool.  She had a significantly decreased hemoglobin of 5.8 and elevated lactic acid level.    Hospital medicine has been asked to admit for further evaluation and treatment.     Hospital Course:  Ms. Malhotra was admitted with an UGIB with an initial H/H of 5.8/19.8. She was recently diagnosed with  cirrhosis secondary to hepatitis C. Pt was empirically transfused 2 U PRBC and was started on PPI and octreotide gtt, and Rocephin for prophylaxis. Pulmonary performed paracentesis with removal of 1720 mL of ascitic fluid prior to patient undergoing EGD by GI on 5/28/19. EGD showed non-bleeding grade I esophageal varices. Two non-bleeding gastric ulcers with adherent clot. Injected. Due to the location of the ulcers seen only on complete retroflexion, cautery and hemoclip placement were unable to be performed. Portal hypertensive gastropathy. Normal examined duodenum. Patient returned to ICU for continued monitoring after procedure. H/H increased to 9.6/31.0 which was higher than expected likely reflecting previous level was higher than reported given persistently high levels that remained stable post transfusion. Ascitic fluid negative for SBP and consistent with portal HTN. Pt also with some cognitive impairment due to hepatic encephalopathy with ammonia level of 135  Lactulose started on 5/28. Patient is also on low Na diet along with lasix and spironolactone for her ascites. Improvement of Ammonia, but patient still with confusion.    Interval History: No complaints. Confused and agitated overnight per nursing.    Review of Systems   HENT: Negative for ear discharge and ear pain.    Eyes: Negative for pain and itching.   Endocrine: Negative for polyphagia and polyuria.   Neurological: Negative for seizures and syncope.     Objective:     Vital Signs (Most Recent):  Temp: 99.2 °F (37.3 °C) (05/30/19 1209)  Pulse: 96 (05/30/19 1209)  Resp: 16 (05/30/19 1209)  BP: 128/60 (05/30/19 1209)  SpO2: 98 % (05/30/19 1209) Vital Signs (24h Range):  Temp:  [98.1 °F (36.7 °C)-99.2 °F (37.3 °C)] 99.2 °F (37.3 °C)  Pulse:  [] 96  Resp:  [16-18] 16  SpO2:  [95 %-99 %] 98 %  BP: (122-149)/(60-75) 128/60     Weight: 62.5 kg (137 lb 12.6 oz)  Body mass index is 27.83 kg/m².  No intake or output data in the 24 hours ending  05/30/19 1400   Physical Exam   Constitutional: No distress.   Older than stated age    HENT:   Head: Normocephalic and atraumatic.   Right Ear: External ear normal.   Mouth/Throat: No oropharyngeal exudate.   Eyes: Pupils are equal, round, and reactive to light. Conjunctivae and EOM are normal. Right eye exhibits no discharge. Left eye exhibits no discharge. No scleral icterus.   No scleral icterus    Neck: Normal range of motion. No JVD present. No tracheal deviation present. No thyromegaly present.   Cardiovascular: Normal rate, regular rhythm and normal heart sounds. Exam reveals no friction rub.   No murmur heard.  Pulmonary/Chest: Effort normal. No respiratory distress. She has no wheezes.   Decreased breath sounds at bases, R > L   Abdominal: Soft. Bowel sounds are normal. She exhibits no mass. There is no tenderness. There is no rebound and no guarding. No hernia.   + ascites   Musculoskeletal: Normal range of motion. She exhibits edema. She exhibits no tenderness or deformity.   Trace edema to LE bilaterally   Lymphadenopathy:     She has no cervical adenopathy.   Neurological: She is alert. No sensory deficit. She exhibits normal muscle tone. Coordination normal.   Slow to answer but mostly appropriate, mild confusion.   Skin: Skin is warm and dry. Capillary refill takes less than 2 seconds. No rash noted. She is not diaphoretic. No erythema. No pallor.   Psychiatric: Cognition and memory are impaired.   Nursing note and vitals reviewed.      Significant Labs:   BMP:   Recent Labs   Lab 05/30/19  0737   *      K 3.3*      CO2 24   BUN 18   CREATININE 1.2   CALCIUM 7.6*     CBC:   Recent Labs   Lab 05/29/19  0626  05/29/19  1038 05/29/19  1744 05/30/19  0737   WBC 5.93  5.93  --   --   --  5.03   HGB 10.1*  10.1*  --   --   --  8.5*   HCT 32.5*  32.5*   < > 30.7* 31.4* 27.1*     179  --   --   --  165    < > = values in this interval not displayed.         Assessment/Plan:       * Acute upper GI bleeding  Initial H/H of 5.8/19.8 with underlying cirrhosis secondary to hepatitis C.   Pt was empirically transfused 2 U PRBC  Started on PPI and octreotide gtt.  S/p paracentesis with removal of 1720 mL of ascitic fluid.  EGD on 5/28 showed non-bleeding grade I esophageal varices. Two non-bleeding gastric ulcers with adherent clot. Injected. Due to the location of the ulcers seen only on complete retroflexion, cautery and hemoclip placement were unable to be performed. Portal hypertensive gastropathy.  H/H increased to 9.6/31 post transfusion.   Ascitic fluid negative for SBP and consistent with portal HTN.   U/S showed cirrhosis   Pt also with some cognitive impairment due to hepatic encephalopathy with ammonia level of 135    Lactulose and Rifaximin started. Patient is also on low Na diet and on lasix and spironolactone.  Much improved ammonia, but patient still with confusion.  Symptoms possibly related to acute delirium from hospitalization.  Delirium precautions.  Will try Seroquel tonight.  Some drop in H/H, but no further bleeding.  Continue to monitor.  Stop drips and change to oral PPI.  PT/OT evaluation.    Multiple gastric ulcers  Seen on EGD  As discussed above    Esophageal varices without bleeding  Seen on EGD  As discussed above    Debility  PT/OT to evaluate and treat    Hepatic encephalopathy  As above.    Cirrhosis of liver with ascites  As expected and seen on U/S  S/p paracentesis and on appropriate mediations as discussed above    Ascites  S/p paracentesis  Negative for SBP  Continue Low Na diet with lasix and spironolactone    Hepatitis C  Hep C positive, treatment naive and genotype pending  Will need outpatient follow up with GI for management and possible treatment.    Symptomatic anemia  As discussed above under GIB    Diabetes mellitus, type 2  HgbA1C 6.3  Glucose control with SSI and diet   Will continue to monitor      VTE Risk Mitigation (From admission, onward)         Ordered     Place sequential compression device  Until discontinued      05/28/19 0146     IP VTE LOW RISK PATIENT  Once      05/27/19 8301              Tuan Barnes MD  Department of Hospital Medicine   [facility]

## 2019-05-31 LAB
ALBUMIN SERPL BCP-MCNC: 2.4 G/DL (ref 3.5–5.2)
ALP SERPL-CCNC: 117 U/L (ref 55–135)
ALT SERPL W/O P-5'-P-CCNC: <5 U/L (ref 10–44)
AMMONIA PLAS-SCNC: 44 UMOL/L (ref 10–50)
ANION GAP SERPL CALC-SCNC: 9 MMOL/L (ref 8–16)
AST SERPL-CCNC: 48 U/L (ref 10–40)
BASOPHILS # BLD AUTO: 0.01 K/UL (ref 0–0.2)
BASOPHILS NFR BLD: 0.3 % (ref 0–1.9)
BILIRUB SERPL-MCNC: 1.2 MG/DL (ref 0.1–1)
BUN SERPL-MCNC: 16 MG/DL (ref 8–23)
CALCIUM SERPL-MCNC: 7.5 MG/DL (ref 8.7–10.5)
CHLORIDE SERPL-SCNC: 106 MMOL/L (ref 95–110)
CO2 SERPL-SCNC: 23 MMOL/L (ref 23–29)
CREAT SERPL-MCNC: 1.2 MG/DL (ref 0.5–1.4)
DIFFERENTIAL METHOD: ABNORMAL
EOSINOPHIL # BLD AUTO: 0 K/UL (ref 0–0.5)
EOSINOPHIL NFR BLD: 0.5 % (ref 0–8)
ERYTHROCYTE [DISTWIDTH] IN BLOOD BY AUTOMATED COUNT: 15.7 % (ref 11.5–14.5)
EST. GFR  (AFRICAN AMERICAN): 55 ML/MIN/1.73 M^2
EST. GFR  (NON AFRICAN AMERICAN): 48 ML/MIN/1.73 M^2
GLUCOSE SERPL-MCNC: 210 MG/DL (ref 70–110)
H PYLORI AG STL QL IA: NOT DETECTED
HCT VFR BLD AUTO: 25.8 % (ref 37–48.5)
HGB BLD-MCNC: 8.3 G/DL (ref 12–16)
LYMPHOCYTES # BLD AUTO: 0.5 K/UL (ref 1–4.8)
LYMPHOCYTES NFR BLD: 11.3 % (ref 18–48)
MCH RBC QN AUTO: 29.6 PG (ref 27–31)
MCHC RBC AUTO-ENTMCNC: 32.2 G/DL (ref 32–36)
MCV RBC AUTO: 92 FL (ref 82–98)
MONOCYTES # BLD AUTO: 0.6 K/UL (ref 0.3–1)
MONOCYTES NFR BLD: 13.8 % (ref 4–15)
NEUTROPHILS # BLD AUTO: 3 K/UL (ref 1.8–7.7)
NEUTROPHILS NFR BLD: 74.1 % (ref 38–73)
PLATELET # BLD AUTO: 136 K/UL (ref 150–350)
PMV BLD AUTO: 10.4 FL (ref 9.2–12.9)
POCT GLUCOSE: 124 MG/DL (ref 70–110)
POCT GLUCOSE: 193 MG/DL (ref 70–110)
POCT GLUCOSE: 219 MG/DL (ref 70–110)
POCT GLUCOSE: 95 MG/DL (ref 70–110)
POTASSIUM SERPL-SCNC: 3.4 MMOL/L (ref 3.5–5.1)
PROT SERPL-MCNC: 5.5 G/DL (ref 6–8.4)
RBC # BLD AUTO: 2.8 M/UL (ref 4–5.4)
SODIUM SERPL-SCNC: 138 MMOL/L (ref 136–145)
WBC # BLD AUTO: 3.99 K/UL (ref 3.9–12.7)

## 2019-05-31 PROCEDURE — 25000003 PHARM REV CODE 250: Performed by: HOSPITALIST

## 2019-05-31 PROCEDURE — 21400001 HC TELEMETRY ROOM

## 2019-05-31 PROCEDURE — 97535 SELF CARE MNGMENT TRAINING: CPT

## 2019-05-31 PROCEDURE — 99232 PR SUBSEQUENT HOSPITAL CARE,LEVL II: ICD-10-PCS | Mod: ,,, | Performed by: PSYCHIATRY & NEUROLOGY

## 2019-05-31 PROCEDURE — 63600175 PHARM REV CODE 636 W HCPCS: Performed by: HOSPITALIST

## 2019-05-31 PROCEDURE — 80053 COMPREHEN METABOLIC PANEL: CPT

## 2019-05-31 PROCEDURE — 82140 ASSAY OF AMMONIA: CPT

## 2019-05-31 PROCEDURE — A4216 STERILE WATER/SALINE, 10 ML: HCPCS | Performed by: HOSPITALIST

## 2019-05-31 PROCEDURE — 97530 THERAPEUTIC ACTIVITIES: CPT

## 2019-05-31 PROCEDURE — 97110 THERAPEUTIC EXERCISES: CPT

## 2019-05-31 PROCEDURE — 85025 COMPLETE CBC W/AUTO DIFF WBC: CPT

## 2019-05-31 PROCEDURE — 94640 AIRWAY INHALATION TREATMENT: CPT

## 2019-05-31 PROCEDURE — 97116 GAIT TRAINING THERAPY: CPT

## 2019-05-31 PROCEDURE — 99232 SBSQ HOSP IP/OBS MODERATE 35: CPT | Mod: ,,, | Performed by: PSYCHIATRY & NEUROLOGY

## 2019-05-31 RX ADMIN — RIFAXIMIN 200 MG: 200 TABLET ORAL at 12:05

## 2019-05-31 RX ADMIN — CARBIDOPA AND LEVODOPA 2 TABLET: 25; 100 TABLET, EXTENDED RELEASE ORAL at 09:05

## 2019-05-31 RX ADMIN — PANTOPRAZOLE SODIUM 40 MG: 40 TABLET, DELAYED RELEASE ORAL at 09:05

## 2019-05-31 RX ADMIN — FLUTICASONE FUROATE AND VILANTEROL TRIFENATATE 1 PUFF: 200; 25 POWDER RESPIRATORY (INHALATION) at 10:05

## 2019-05-31 RX ADMIN — PANTOPRAZOLE SODIUM 40 MG: 40 TABLET, DELAYED RELEASE ORAL at 08:05

## 2019-05-31 RX ADMIN — LEVETIRACETAM 500 MG: 500 TABLET ORAL at 08:05

## 2019-05-31 RX ADMIN — AMITRIPTYLINE HYDROCHLORIDE 25 MG: 25 TABLET, FILM COATED ORAL at 09:05

## 2019-05-31 RX ADMIN — SPIRONOLACTONE 100 MG: 25 TABLET ORAL at 08:05

## 2019-05-31 RX ADMIN — Medication 10 ML: at 07:05

## 2019-05-31 RX ADMIN — RIFAXIMIN 200 MG: 200 TABLET ORAL at 09:05

## 2019-05-31 RX ADMIN — FUROSEMIDE 40 MG: 40 TABLET ORAL at 08:05

## 2019-05-31 RX ADMIN — LACTULOSE 30 G: 20 SOLUTION ORAL at 09:05

## 2019-05-31 RX ADMIN — INSULIN ASPART 4 UNITS: 100 INJECTION, SOLUTION INTRAVENOUS; SUBCUTANEOUS at 12:05

## 2019-05-31 RX ADMIN — QUETIAPINE FUMARATE 25 MG: 25 TABLET ORAL at 09:05

## 2019-05-31 RX ADMIN — MORPHINE SULFATE 2 MG: 10 INJECTION INTRAVENOUS at 09:05

## 2019-05-31 RX ADMIN — LACTULOSE 30 G: 20 SOLUTION ORAL at 08:05

## 2019-05-31 RX ADMIN — LEVETIRACETAM 500 MG: 500 TABLET ORAL at 09:05

## 2019-05-31 NOTE — PLAN OF CARE
Patient will discharge home with home health when ready for discharge.       05/31/19 9430   Discharge Reassessment   Assessment Type Discharge Planning Reassessment   Provided patient/caregiver education on the expected discharge date and the discharge plan Yes   Discharge Plan A Home Health;Home with family;Home   Discharge Plan B Home with family;Home;Home Health   Anticipated Discharge Disposition Home-Health   Can the patient answer the patient profile reliably? Yes, cognitively intact   How does the patient rate their overall health at the present time? Fair   Describe the patient's ability to walk at the present time. Walks with the help of equipment   How often would a person be available to care for the patient? Often   Number of comorbid conditions (as recorded on the chart) Four

## 2019-05-31 NOTE — PLAN OF CARE
Problem: Physical Therapy Goal  Goal: Physical Therapy Goal  Goals to be met by: 19     Patient will increase functional independence with mobility by performin. Supine to sit with Modified [place]  2. Rolling to Left and Right with Modified [place]  3. Sit to stand transfer with Modified [place] using RW  4. Bed to chair transfer with Modified [place] using Rolling Walker  5. Gait x200 feet with Modified [place] using Rolling Walker  6. Lower extremity exercise program 3 sets x10 reps per handout, with independence     Outcome: Ongoing (interventions implemented as appropriate)  Pt ambulated ~ 200 ft with RW, CGA . Pt required V/T cues for safety technique and walker management. Pt is progressing well toward treatment goals.

## 2019-05-31 NOTE — ASSESSMENT & PLAN NOTE
Initial H/H of 5.8/19.8 with underlying cirrhosis secondary to hepatitis C.   Pt was empirically transfused 2 U PRBC  Started on PPI and octreotide gtt.  S/p paracentesis with removal of 1720 mL of ascitic fluid.  EGD on 5/28 showed non-bleeding grade I esophageal varices. Two non-bleeding gastric ulcers with adherent clot. Injected. Due to the location of the ulcers seen only on complete retroflexion, cautery and hemoclip placement were unable to be performed. Portal hypertensive gastropathy.  H/H increased to 9.6/31 post transfusion.   Ascitic fluid negative for SBP and consistent with portal HTN.   U/S showed cirrhosis   Some drop in H/H, but no further bleeding.  Continue to monitor.  Stop drips and change to oral PPI.  PT/OT evaluation.

## 2019-05-31 NOTE — CONSULTS
[facility]  Neurology  Consult Note    Patient Name: Jeanie Malhotra  MRN: [id number]  Admission Date: 5/27/2019  Hospital Length of Stay: 4 days  Code Status: Full Code   Attending Provider: Tuan Barnes MD   Consulting Provider: Leif Esquivel MD  Primary Care Physician: Jaxon Sharp MD  Principal Problem:Acute upper GI bleeding    Inpatient consult to Neurology  Consult performed by: Leif Esquivel MD  Consult ordered by: Tuan Barnes MD        Subjective:     Chief Complaint: AMS    HPI: 64 y/o female with medical Hx as listed below comes to ED after several days of abdominal distension and bloody stools. Pt had recently been Dx with Hep C. Ms. Malhotra was transfused with PRBC's, had paracentesis, and underwent EGD. Her ammonia was also found to be 135 umol/L which was treated with lactulose. It has been noted that pt has episodes of confusion that seem to have an onset and nighttime and early moring as soon as she wakes up. Pt even has had hallucinations and gets disturbed. Her  and her daughter state that for around a year they have seen problems with her short-term memory. Often she gets frustrated due to word-finding difficulties.    Past Medical History:   Diagnosis Date    Diabetes mellitus     GERD (gastroesophageal reflux disease)     Hepatic encephalopathy 5/29/2019    Hypertension        Past Surgical History:   Procedure Laterality Date    ABDOMINAL SURGERY      BREAST CYST EXCISION      EGD (ESOPHAGOGASTRODUODENOSCOPY) Left 5/28/2019    Performed by Jairo Joy MD at [facility] ENDO       Review of patient's allergies indicates:   Allergen Reactions    Contrast media        Current Neurological Medications:     No current facility-administered medications on file prior to encounter.      Current Outpatient Medications on File Prior to Encounter   Medication Sig    alprazolam (XANAX) 0.25 MG tablet TAKE 1 TABLET BY MOUTH 3 TIMES A DAY AS NEEDED FOR ANXIETY     atorvastatin (LIPITOR) 40 MG tablet Take 40 mg by mouth once daily.    butalbital-acetaminophen-caffeine -40 mg (FIORICET, ESGIC) -40 mg per tablet Take 1 tablet by mouth once daily.    carbidopa-levodopa  mg (SINEMET CR)  mg TbSR Take 2 tablets by mouth nightly.    levetiracetam (KEPPRA) 250 MG Tab Take 500 mg by mouth 2 (two) times daily.     losartan (COZAAR) 25 MG tablet Take 25 mg by mouth once daily.    metformin (GLUCOPHAGE) 1000 MG tablet Take 1,000 mg by mouth 2 (two) times daily with meals.      ADVAIR DISKUS 250-50 mcg/dose diskus inhaler USE 1 INHALATION BY MOUTH TWICE A DAY    amitriptyline (ELAVIL) 25 MG tablet Take 25 mg by mouth every evening.    candesartan-hydrochlorothiazid (ATACAND HCT) 32-12.5 mg per tablet Take 1 tablet by mouth once daily.    EASY TOUCH TWIST LANCETS 33 gauge Misc USE TO CHECK BLOOD SUGAR DAILY    hydrocodone-acetaminophen 10-325mg (NORCO)  mg Tab Take 1 tablet by mouth 2 (two) times daily.    LIRAGLUTIDE (VICTOZA 2-ANABEL SUBQ) Inject into the skin.    TRUETEST TEST STRIPS Strp USE TO CHECK BLOOD SUGAR DAILY    VENTOLIN HFA 90 mcg/actuation inhaler Inhale 2 puffs into the lungs every 4 (four) hours as needed.    ZOSTAVAX, PF, 19,400 unit/0.65 mL injection       Family History     Problem Relation (Age of Onset)    Cataracts Mother    Glaucoma Mother    No Known Problems Father, Sister, Brother, Maternal Aunt, Maternal Uncle, Paternal Aunt, Paternal Uncle, Maternal Grandmother, Maternal Grandfather, Paternal Grandmother, Paternal Grandfather        Tobacco Use    Smoking status: Former Smoker   Substance and Sexual Activity    Alcohol use: No    Drug use: Not on file    Sexual activity: Not on file     Review of Systems   Constitutional: Negative for fever.   HENT: Negative for trouble swallowing.    Eyes: Negative for photophobia.   Respiratory: Negative for shortness of breath.    Cardiovascular: Negative for chest pain.    Gastrointestinal: Negative for abdominal pain.   Genitourinary: Negative for dysuria.   Musculoskeletal: Negative for back pain.   Neurological: Negative for headaches.     Objective:     Vital Signs (Most Recent):  Temp: 99.2 °F (37.3 °C) (05/31/19 1559)  Pulse: 91 (05/31/19 1559)  Resp: 18 (05/31/19 1559)  BP: 124/63 (05/31/19 1559)  SpO2: 97 % (05/31/19 1559) Vital Signs (24h Range):  Temp:  [98.3 °F (36.8 °C)-99.6 °F (37.6 °C)] 99.2 °F (37.3 °C)  Pulse:  [] 91  Resp:  [18] 18  SpO2:  [95 %-100 %] 97 %  BP: (106-143)/(58-70) 124/63     Weight: 63.6 kg (140 lb 3.4 oz)  Body mass index is 28.32 kg/m².    Physical Exam   Constitutional: She is oriented to person, place, and time. No distress.   HENT:   Head: Normocephalic.   Eyes: Right eye exhibits no discharge. Left eye exhibits no discharge.   Neck: Normal range of motion.   Cardiovascular: Regular rhythm.   Pulmonary/Chest: Breath sounds normal.   Abdominal: Bowel sounds are normal.   Neurological: She is oriented to person, place, and time.   Skin: She is not diaphoretic.       NEUROLOGICAL EXAMINATION:     MENTAL STATUS   Oriented to person, place, and time.       Significant Labs:   CBC:   Recent Labs   Lab 05/31/19  0844 06/01/19  0533   WBC 3.99 4.33   HGB 8.3* 8.4*   HCT 25.8* 26.0*   * 133*     CMP:   Recent Labs   Lab 05/31/19  0844 06/01/19  0533   * 151*    138   K 3.4* 3.1*    105   CO2 23 24   BUN 16 16   CREATININE 1.2 1.3   CALCIUM 7.5* 7.7*   PROT 5.5* 5.8*   ALBUMIN 2.4* 2.5*   BILITOT 1.2* 1.2*   ALKPHOS 117 108   AST 48* 49*   ALT <5* 8*   ANIONGAP 9 9   EGFRNONAA 48* 43*       Significant Imaging:   Head CT    FINDINGS:  Moderate diffuse cerebral volume loss.  The midline structures are unremarkable.  No extra-axial fluid collections or intracranial hemorrhage.  No mass effect.  There is normal gray-white differentiation.  The sulci and ventricles are unremarkable.  Visualized portions the paranasal sinuses  and mastoid air cells are clear.      Impression       No acute intracranial abnormality.      Electronically signed by: Shahram Mccarty MD  Date: 05/31/2019  Time: 15:12         Assessment and Plan:     64 y/o female consulted for AMS    1. AMS: there seems to be a component of delirium as her symptoms have and onset at night and improve during the day.   Family reports short-term memory disturbances for several months. There may be underlying cognitive impairment. On top of that her co-morbidities including hepatic failure may be adding to her symptoms. Ammonia has normalized although is does not always correlates with  Degree of encephalopathy.   -Will obtain B12 and TSH   -Continue quetiapine nightly for now.   -Follow up with neurology upon D/C.   Discussed with family and pt.    Active Diagnoses:    Diagnosis Date Noted POA    PRINCIPAL PROBLEM:  Acute upper GI bleeding [K92.2] 05/28/2019 Yes    Hepatic encephalopathy [K72.90] 05/29/2019 Yes    Debility [R53.81] 05/29/2019 Yes    Esophageal varices without bleeding [I85.00] 05/29/2019 Yes    Multiple gastric ulcers [K25.9] 05/29/2019 Yes    Symptomatic anemia [D64.9] 05/28/2019 Yes    Hepatitis C [B19.20] 05/28/2019 Yes    Ascites [R18.8] 05/28/2019 Yes    Cirrhosis of liver with ascites [K74.60, R18.8] 05/28/2019 Yes    Diabetes mellitus, type 2 [E11.9] 12/26/2012 Yes      Problems Resolved During this Admission:       VTE Risk Mitigation (From admission, onward)        Ordered     Place sequential compression device  Until discontinued      05/28/19 0146     IP VTE LOW RISK PATIENT  Once      05/27/19 2450          Thank you for your consult. I will follow-up with patient. Please contact us if you have any additional questions.    Leif Esquivel MD  Neurology  [facility]

## 2019-05-31 NOTE — PT/OT/SLP PROGRESS
Physical Therapy Treatment    Patient Name:  Jeanie Malhotra   MRN:  [id number]    Recommendations:     Discharge Recommendations:  home health PT   Discharge Equipment Recommendations: tub bench   Barriers to discharge: None    Assessment:     Jeanie Malhotra is a 63 y.o. female admitted with a medical diagnosis of Acute upper GI bleeding.  She presents with the following impairments/functional limitations:  weakness, impaired endurance, impaired self care skills, gait instability, impaired balance, decreased upper extremity function, decreased lower extremity function, decreased ROM, impaired functional mobilty, impaired cognition, decreased safety awareness, pain, edema, decreased coordination . Pt ambulated ~ 200 ft with RW, CGA . Pt required V/T cues for safety technique and walker management.  Pt requires assistance for all OOB mobility, transfer and gait training 2* confused and poor safety awareness .     Rehab Prognosis: Fair; patient would benefit from acute skilled PT services to address these deficits and reach maximum level of function.    Recent Surgery: Procedure(s) (LRB):  EGD (ESOPHAGOGASTRODUODENOSCOPY) (Left) 3 Days Post-Op    Plan:     During this hospitalization, patient to be seen 5 x/week(M-F) to address the identified rehab impairments via gait training, therapeutic activities, therapeutic exercises and progress toward the following goals:    · Plan of Care Expires:  06/12/19    Subjective     Chief Complaint: none  Patient/Family Comments/goals: pt agreeable to treatment   Pain/Comfort:  · Pain Rating 1: 0/10  · Pain Rating Post-Intervention 1: 0/10      Objective:     Communicated with nurse Hull prior to session.  Patient found up in chair with family present telemetry upon PT entry to room.     General Precautions: Standard, fall   Orthopedic Precautions:N/A   Braces: N/A     Functional Mobility:  · Bed Mobility:     · Scooting: stand by assistance  · Sit to Supine: contact guard  assistance  · Transfers:     · Sit to Stand:  contact guard assistance with hand-held assist and rolling walker  ·  Chair to bed : contact guard assistance with  hand-held assist and rolling walker  using  Stand Pivot  · Gait: Pt ambulated ~ 200 ft with RW, CGA . Noted with decreased major, decreased step length, minimal unsteadiness however no LOB.  Pt required V/T cues for safety technique and walker management.      AM-PAC 6 CLICK MOBILITY  Turning over in bed (including adjusting bedclothes, sheets and blankets)?: 4  Sitting down on and standing up from a chair with arms (e.g., wheelchair, bedside commode, etc.): 3  Moving from lying on back to sitting on the side of the bed?: 4  Moving to and from a bed to a chair (including a wheelchair)?: 3  Need to walk in hospital room?: 3  Climbing 3-5 steps with a railing?: 3  Basic Mobility Total Score: 20       Therapeutic Activities and Exercises:   Pt performed bed mobility, transfer and gait training as above.  Pt performed seated BLE x 10 reps : AP, LAQ, HS. VC's for proper and sequence.   BLE HEP given to pt's granddaughter and daughter with instruction and demonstration . They verbalize understating and  Stated they will able to assist pt to perform BLE HEP.     Patient left with bed in chair position with all lines intact, call button in reach, bed alarm on, nurse notified and family and [employer] monitor present..    GOALS:   Multidisciplinary Problems     Physical Therapy Goals        Problem: Physical Therapy Goal    Goal Priority Disciplines Outcome Goal Variances Interventions   Physical Therapy Goal     PT, PT/OT Ongoing (interventions implemented as appropriate)     Description:  Goals to be met by: 19     Patient will increase functional independence with mobility by performin. Supine to sit with Modified [place]  2. Rolling to Left and Right with Modified [place]  3. Sit to stand transfer with Modified [place] using RW  4. Bed  to chair transfer with Modified [place] using Rolling Walker  5. Gait x200 feet with Modified [place] using Rolling Walker  6. Lower extremity exercise program 3 sets x10 reps per handout, with independence                      Time Tracking:     PT Received On: 05/31/19  PT Start Time: 1210     PT Stop Time: 1242  PT Total Time (min): 32 min , PTA stepped out ~ 8 min while MD present     Billable Minutes: Gait Training 12, Therapeutic Activity 12  Treatment Type: Treatment  PT/PTA: PTA     PTA Visit Number: 2     Marsha Wasserman, PTA  05/31/2019

## 2019-05-31 NOTE — PROGRESS NOTES
[facility] Hospital Medicine  Progress Note    Patient Name: Jeanie Malhotra  MRN: [id number]  Patient Class: IP- Inpatient   Admission Date: 5/27/2019  Length of Stay: 4 days  Attending Physician: Tuan Barnes MD  Primary Care Provider: Jaxon Sharp MD        Subjective:     Principal Problem:Acute upper GI bleeding    HPI:  Jeanie Malhotra is a 63 y.o. female that (in part)  has a past medical history of Diabetes mellitus, GERD (gastroesophageal reflux disease), and Hypertension.  has a past surgical history that includes Breast cyst excision and Abdominal surgery. Presents to [facility] Emergency Department complaining of rectal bleeding which started earlier today.  She has had increasing abdominal distension over the last week with progressive worsening over the weekend.  She was previously undergoing evaluation for liver dysfunction and was diagnosed with hepatitis C and February of this year.  However, she lost her insurance and has not been able to follow-up as recommended.  Denies fever chills.  Positive for confusion.  Patient is not the best historian due to suspected metabolic encephalopathy.  She has had dyspnea with exertion and shortness of breath at rest.  Increasing skin pallor, easy bruising, and loose stools.  Positive for melena and hematochezia.  Patient has history of abdominal surgery occurring over 10 years ago but cannot specify exactly what was performed.  History is otherwise unreliable in limited at this time.    In the emergency department routine laboratory studies were obtained and rectal exam was performed with evidence of Hemoccult-positive stool.  She had a significantly decreased hemoglobin of 5.8 and elevated lactic acid level.    Hospital medicine has been asked to admit for further evaluation and treatment.     Hospital Course:  Ms. Malhotra was admitted with an UGIB with an initial H/H of 5.8/19.8. She was recently diagnosed with  cirrhosis secondary to hepatitis C. Pt was empirically transfused 2 U PRBC and was started on PPI and octreotide gtt, and Rocephin for prophylaxis. Pulmonary performed paracentesis with removal of 1720 mL of ascitic fluid prior to patient undergoing EGD by GI on 5/28/19. EGD showed non-bleeding grade I esophageal varices. Two non-bleeding gastric ulcers with adherent clot. Injected. Due to the location of the ulcers seen only on complete retroflexion, cautery and hemoclip placement were unable to be performed. Portal hypertensive gastropathy. Normal examined duodenum. Patient returned to ICU for continued monitoring after procedure. H/H increased to 9.6/31.0 which was higher than expected likely reflecting previous level was higher than reported given persistently high levels that remained stable post transfusion. Ascitic fluid negative for SBP and consistent with portal HTN. Pt also with some cognitive impairment due to hepatic encephalopathy with ammonia level of 135  Lactulose started on 5/28. Patient is also on low Na diet along with lasix and spironolactone for her ascites. Improvement of Ammonia, but patient still with confusion.    Interval History: Seems to be doing much better today.  Family stating she remains confused.    Review of Systems   HENT: Negative for ear discharge and ear pain.    Eyes: Negative for pain and itching.   Endocrine: Negative for polyphagia and polyuria.   Neurological: Negative for seizures and syncope.     Objective:     Vital Signs (Most Recent):  Temp: 99 °F (37.2 °C) (05/31/19 1201)  Pulse: 93 (05/31/19 1201)  Resp: 18 (05/31/19 1201)  BP: 130/62 (05/31/19 1201)  SpO2: 95 % (05/31/19 1201) Vital Signs (24h Range):  Temp:  [98.3 °F (36.8 °C)-99.7 °F (37.6 °C)] 99 °F (37.2 °C)  Pulse:  [] 93  Resp:  [16-18] 18  SpO2:  [95 %-100 %] 95 %  BP: (106-143)/(58-70) 130/62     Weight: 63.6 kg (140 lb 3.4 oz)  Body mass index is 28.32 kg/m².  No intake or output data in the 24 hours  ending 05/31/19 1423   Physical Exam   Constitutional: She is oriented to person, place, and time. No distress.   Older than stated age    HENT:   Head: Normocephalic and atraumatic.   Right Ear: External ear normal.   Mouth/Throat: No oropharyngeal exudate.   Eyes: Pupils are equal, round, and reactive to light. Conjunctivae and EOM are normal. Right eye exhibits no discharge. Left eye exhibits no discharge. No scleral icterus.   No scleral icterus    Neck: Normal range of motion. No JVD present. No tracheal deviation present. No thyromegaly present.   Cardiovascular: Normal rate, regular rhythm and normal heart sounds. Exam reveals no friction rub.   No murmur heard.  Pulmonary/Chest: Effort normal. No respiratory distress. She has no wheezes.   Decreased breath sounds at bases, R > L   Abdominal: Soft. Bowel sounds are normal. She exhibits no mass. There is no tenderness. There is no rebound and no guarding. No hernia.   + ascites   Musculoskeletal: Normal range of motion. She exhibits no tenderness or deformity.   Trace edema to LE bilaterally   Lymphadenopathy:     She has no cervical adenopathy.   Neurological: She is alert and oriented to person, place, and time. No sensory deficit. She exhibits normal muscle tone. Coordination normal.   Skin: Skin is warm and dry. Capillary refill takes less than 2 seconds. No rash noted. She is not diaphoretic. No erythema. No pallor.   Psychiatric: Her mood appears anxious. Her speech is rapid and/or pressured. Cognition and memory are impaired. She is inattentive.   Nursing note and vitals reviewed.      Significant Labs:   BMP:   Recent Labs   Lab 05/31/19  0844   *      K 3.4*      CO2 23   BUN 16   CREATININE 1.2   CALCIUM 7.5*     CBC:   Recent Labs   Lab 05/29/19  1744 05/30/19  0737 05/31/19  0844   WBC  --  5.03 3.99   HGB  --  8.5* 8.3*   HCT 31.4* 27.1* 25.8*   PLT  --  165 136*         Assessment/Plan:      * Acute upper GI bleeding  Initial  H/H of 5.8/19.8 with underlying cirrhosis secondary to hepatitis C.   Pt was empirically transfused 2 U PRBC  Started on PPI and octreotide gtt.  S/p paracentesis with removal of 1720 mL of ascitic fluid.  EGD on 5/28 showed non-bleeding grade I esophageal varices. Two non-bleeding gastric ulcers with adherent clot. Injected. Due to the location of the ulcers seen only on complete retroflexion, cautery and hemoclip placement were unable to be performed. Portal hypertensive gastropathy.  H/H increased to 9.6/31 post transfusion.   Ascitic fluid negative for SBP and consistent with portal HTN.   U/S showed cirrhosis   Some drop in H/H, but no further bleeding.  Continue to monitor.  Stop drips and change to oral PPI.  PT/OT evaluation.    Hepatic encephalopathy  Pt also with some cognitive impairment due to hepatic encephalopathy with ammonia level of 135    Lactulose and Rifaximin started. Patient is also on low Na diet and on lasix and spironolactone.  Much improved ammonia, but patient remained with confusion.  Symptoms worse at night.  Symptoms possibly related to acute delirium/sundowning from hospitalization.  Delirium precautions.  Started on Seroquel.    Seems better, but slight confusion.  Family states that symptoms were presenting prior to acute issues.  Would like to get Neuro evaluation.  Check Head CT.    Multiple gastric ulcers  Seen on EGD  As discussed above    Esophageal varices without bleeding  Seen on EGD  As discussed above    Debility  PT/OT to evaluate and treat    Cirrhosis of liver with ascites  As expected and seen on U/S  S/p paracentesis and on appropriate mediations as discussed above    Ascites  S/p paracentesis  Negative for SBP  Continue Low Na diet with lasix and spironolactone    Hepatitis C  Hep C positive, treatment naive and genotype pending  Will need outpatient follow up with GI for management and possible treatment.    Symptomatic anemia  As discussed above under  GIB    Diabetes mellitus, type 2  HgbA1C 6.3  Glucose control with SSI and diet   Will continue to monitor      VTE Risk Mitigation (From admission, onward)        Ordered     Place sequential compression device  Until discontinued      05/28/19 0146     IP VTE LOW RISK PATIENT  Once      05/27/19 8076              Tuan Barnes MD  Department of Hospital Medicine   [facility]

## 2019-05-31 NOTE — PLAN OF CARE
Problem: Adult Inpatient Plan of Care  Goal: Plan of Care Review     05/31/19 0453   Plan of Care Review   Plan of Care Reviewed With patient;daughter;grandchild(madi)       Problem: Diabetes Comorbidity  Goal: Blood Glucose Level Within Desired Range    Intervention: Maintain Glycemic Control     05/31/19 0453   Monitor and Manage Ketoacidosis   Glycemic Management blood glucose monitoring         Problem: Adjustment to Illness (Gastrointestinal Bleeding)  Goal: Optimal Coping with Acute Illness    Intervention: Optimize Psychosocial Response to Unexpected Illness     05/31/19 0453   Promote Anxiety Reduction   Supportive Measures active listening utilized;verbalization of feelings encouraged;self-care encouraged         Problem: Infection  Goal: Infection Symptom Resolution    Intervention: Prevent or Manage Infection     05/31/19 0453   Prevent or Manage Infection   Infection Management aseptic technique maintained;cultures obtained and sent to lab

## 2019-05-31 NOTE — SUBJECTIVE & OBJECTIVE
Interval History: Seems to be doing much better today.  Family stating she remains confused.    Review of Systems   HENT: Negative for ear discharge and ear pain.    Eyes: Negative for pain and itching.   Endocrine: Negative for polyphagia and polyuria.   Neurological: Negative for seizures and syncope.     Objective:     Vital Signs (Most Recent):  Temp: 99 °F (37.2 °C) (05/31/19 1201)  Pulse: 93 (05/31/19 1201)  Resp: 18 (05/31/19 1201)  BP: 130/62 (05/31/19 1201)  SpO2: 95 % (05/31/19 1201) Vital Signs (24h Range):  Temp:  [98.3 °F (36.8 °C)-99.7 °F (37.6 °C)] 99 °F (37.2 °C)  Pulse:  [] 93  Resp:  [16-18] 18  SpO2:  [95 %-100 %] 95 %  BP: (106-143)/(58-70) 130/62     Weight: 63.6 kg (140 lb 3.4 oz)  Body mass index is 28.32 kg/m².  No intake or output data in the 24 hours ending 05/31/19 1423   Physical Exam   Constitutional: She is oriented to person, place, and time. No distress.   Older than stated age    HENT:   Head: Normocephalic and atraumatic.   Right Ear: External ear normal.   Mouth/Throat: No oropharyngeal exudate.   Eyes: Pupils are equal, round, and reactive to light. Conjunctivae and EOM are normal. Right eye exhibits no discharge. Left eye exhibits no discharge. No scleral icterus.   No scleral icterus    Neck: Normal range of motion. No JVD present. No tracheal deviation present. No thyromegaly present.   Cardiovascular: Normal rate, regular rhythm and normal heart sounds. Exam reveals no friction rub.   No murmur heard.  Pulmonary/Chest: Effort normal. No respiratory distress. She has no wheezes.   Decreased breath sounds at bases, R > L   Abdominal: Soft. Bowel sounds are normal. She exhibits no mass. There is no tenderness. There is no rebound and no guarding. No hernia.   + ascites   Musculoskeletal: Normal range of motion. She exhibits no tenderness or deformity.   Trace edema to LE bilaterally   Lymphadenopathy:     She has no cervical adenopathy.   Neurological: She is alert and  oriented to person, place, and time. No sensory deficit. She exhibits normal muscle tone. Coordination normal.   Skin: Skin is warm and dry. Capillary refill takes less than 2 seconds. No rash noted. She is not diaphoretic. No erythema. No pallor.   Psychiatric: Her mood appears anxious. Her speech is rapid and/or pressured. Cognition and memory are impaired. She is inattentive.   Nursing note and vitals reviewed.      Significant Labs:   BMP:   Recent Labs   Lab 05/31/19  0844   *      K 3.4*      CO2 23   BUN 16   CREATININE 1.2   CALCIUM 7.5*     CBC:   Recent Labs   Lab 05/29/19  1744 05/30/19  0737 05/31/19  0844   WBC  --  5.03 3.99   HGB  --  8.5* 8.3*   HCT 31.4* 27.1* 25.8*   PLT  --  165 136*

## 2019-05-31 NOTE — PROGRESS NOTES
[facility]  Gastroenterology  Progress Note    Patient Name: Jeanie Malhotra  MRN: [id number]  Admission Date: 5/27/2019  Hospital Length of Stay: 4 days  Code Status: Full Code   Attending Provider: Tuan Barnes MD  Primary Care Physician: Jaxon Sharp MD  Principal Problem: Acute upper GI bleeding    Subjective:     CC= GI bleed    Interval History: Patient more oriented this afternoon, per granddaughter.  Seems slightly confused when questioned, however.  She denies further melena or hematochezia.  She is tolerating solids.  No abdominal pain.    Review of systems:  General: Negative for fevers, chills.  Cardiovascular:  Negative for chest pain, shortness of breath     Objective:     Vital Signs (Most Recent):  Temp: 99 °F (37.2 °C) (05/31/19 1201)  Pulse: 93 (05/31/19 1201)  Resp: 18 (05/31/19 1201)  BP: 130/62 (05/31/19 1201)  SpO2: 95 % (05/31/19 1201) Vital Signs (24h Range):  Temp:  [98.3 °F (36.8 °C)-99.7 °F (37.6 °C)] 99 °F (37.2 °C)  Pulse:  [] 93  Resp:  [16-18] 18  SpO2:  [95 %-100 %] 95 %  BP: (106-143)/(58-70) 130/62     Physical examination:  GEN: Thin elderly WF in no acute distress.   HENT: Normocephalic, anicteric sclera   Cardiovascular: Regular rate and rhythm. No murmurs appreciated.   Chest: Non-labored respirations. Breath sounds equal   Abdomen: Normal BS. Mildly distended with ascites. Nontender.  Psych: Appropriate mood and affect.  Neurological: No asterixis. Alert and oriented x 3.   Extermities: No C/C/E. 2+ dorsalis pedis pulses bilaterally    CBC:   Recent Labs   Lab 05/29/19  1744 05/30/19  0737 05/31/19  0844   WBC  --  5.03 3.99   HGB  --  8.5* 8.3*   HCT 31.4* 27.1* 25.8*   PLT  --  165 136*     CMP:   Recent Labs   Lab 05/31/19  0844   *   CALCIUM 7.5*   ALBUMIN 2.4*   PROT 5.5*      K 3.4*   CO2 23      BUN 16   CREATININE 1.2   ALKPHOS 117   ALT <5*   AST 48*   BILITOT 1.2*         Imaging:  US abdomen (5/28/19):  Impression:  1.  Echogenic liver with nodularity to the surface of the liver with findings suggestive of antegrade flow in the portal vein, likely representing changes from cirrhosis of the liver.  Clinical correlation suggested.  2. Borderline splenomegaly.  3. Cholelithiasis.  4. Gallbladder wall edema or pericholecystic fluid collection either from cholecystitis or may be related to ascites.  5. Four-quadrant ascites.    Assessment:   63 year old female with a history of HTN, DM, GERD and recently diagnosed hepatitis C presenting with altered mental status, a GI bleed and post-hemorrhagic anemia s/p transfusion.  Found to have non-bleeding grade 1 varices, PHG and 2 gastric ulcers with adherent clot s/p injection.  No further overt bleeding over the last 48 hours with stable H/H.  Had 1700 ml of ascites removed earlier this week -- fluid studies c/w cirrhosis.  No SBP.  Mental status improving on lactulose.    Plan:   1.  Continue PPI BID x 6-8 weeks and repeat EGD in 2 months.  2.  Follow up H-pylori stool antigen.  3.  Continue antibiotics for 7 day course.  4.  Continue lactulose to achieve 3-4 daily BM's.  5.  Low sodium diet (2g daily).  6.  Continue diuretics (furosemide 20 mg/aldactone 100 mg daily).  7.  Outpatient work-up/management of HCV.      Amita Pitt PA-C  Gastroenterology  [facility] [place]

## 2019-05-31 NOTE — ASSESSMENT & PLAN NOTE
Pt also with some cognitive impairment due to hepatic encephalopathy with ammonia level of 135    Lactulose and Rifaximin started. Patient is also on low Na diet and on lasix and spironolactone.  Much improved ammonia, but patient remained with confusion.  Symptoms worse at night.  Symptoms possibly related to acute delirium/sundowning from hospitalization.  Delirium precautions.  Started on Seroquel.    Seems better, but slight confusion.  Family states that symptoms were presenting prior to acute issues.  Would like to get Neuro evaluation.  Check Head CT.

## 2019-05-31 NOTE — PT/OT/SLP PROGRESS
"Occupational Therapy   Treatment    Name: Jeanie Malohtra  MRN: [id number]  Admitting Diagnosis:  Acute upper GI bleeding  3 Days Post-Op    Recommendations:     Discharge Recommendations: home with home health  Discharge Equipment Recommendations:  tub bench  Barriers to discharge:       Assessment:     Jeanie Malhotra is a 63 y.o. female with a medical diagnosis of Acute upper GI bleeding.  She presents with the following performance deficits affecting function: weakness, impaired endurance, impaired self care skills, impaired functional mobilty, gait instability, impaired balance, decreased coordination, impaired cognition, decreased upper extremity function, decreased lower extremity function, decreased safety awareness, decreased ROM, impaired coordination.  Patient is making good progress toward OT goals.     Rehab Prognosis:  Good; patient would benefit from acute skilled OT services to address these deficits and reach maximum level of function.       Plan:     Patient to be seen 3 x/week to address the above listed problems via self-care/home management, therapeutic activities, therapeutic exercises  · Plan of Care Expires: 06/12/19  · Plan of Care Reviewed with: patient    Subjective   "My brain is not connecting to my body."  Patient agreeable to therapy.  Pain/Comfort:  · Pain Rating 1: 0/10    Objective:     Communicated with: Nurse Hull prior to session.  Patient found HOB elevated with telemetry and family present upon OT entry to room.    General Precautions: Standard, fall   Orthopedic Precautions:N/A   Braces: N/A     Occupational Performance:     Bed Mobility:    · Patient completed Scooting/Bridging with supervision  · Patient completed Supine to Sit with supervision     Functional Mobility/Transfers:  · Patient completed Sit <> Stand Transfer with contact guard assistance  with  rolling walker x 1 from EOB, x 1 from toilet, x 1 from chair  · Patient completed Toilet Transfer Step Transfer technique " with contact guard assistance with  rolling walker  · Functional Mobility: Patient able to ambulate to bathroom, sink, and chair with CGA/RW. Patient with minimal unsteadiness and decreased coordination; pt required frequent verbal/tactile cues for safety awareness and walker management. Patient with minimal confusion today and required occasional redirection to attend to therapy tasks.    Activities of Daily Living:  · Grooming: stand by assistance and contact guard assistance standing at sink to perform simple oral care and to wash/dry hands after toileting  · Upper Body Dressing: stand by assistance to don back gown  · Toileting: stand by assistance for pericare on toilet    [facility] 6 Click ADL: 18    Treatment & Education:  Patient performed bed mobility, functional transfers, and ADL's as above.  Patient and family educated on safety awareness with all OOB mobility. Instructed patient and family to call for assistance before getting patient OOB/OOC/to bathroom for safety precautions as patient continues to demonstrate unsteadiness with ambulation and remains a fall risk.   Patient able to perform x 15 reps BUE AROM therex in all available planes of motion with verbal cues. Patient provided with handout HEP.  Patient educated on and provided with therex HEP with light resistance theraband x10-15 reps. Patient educated on HEP via handout, verbal instruction, and demonstration.   Patient verbalizes understanding, several family members present during education and verbalize understanding of all of the above. Family states they will be able to assist patient with exercises.     Patient left up in chair with all lines intact, call button in reach, nurse notified and AVASYS and family presentEducation:  .     GOALS:   Multidisciplinary Problems     Occupational Therapy Goals        Problem: Occupational Therapy Goal    Goal Priority Disciplines Outcome Interventions   Occupational Therapy Goal     OT, PT/OT Ongoing  (interventions implemented as appropriate)    Description:  Goals to be met by: 6/12/2019     Patient will increase functional independence with ADLs by performing:    Feeding with [place].  UE Dressing with Modified [place].  LE Dressing with Modified [place].  Grooming while seated with Modified [place].  Toileting from toilet with Set-up Assistance for hygiene and clothing management.   Supine to sit with Modified [place].  Step transfer with Stand-by Assistance  Toilet transfer to toilet with Contact Guard Assistance. Met 5/30  Upper extremity exercise program with assistance as needed.                       Time Tracking:     OT Date of Treatment: 05/31/19  OT Start Time: 1025  OT Stop Time: 1104  OT Total Time (min): 39 min    Billable Minutes:Self Care/Home Management 23  Therapeutic Exercise 16    ANA Garcia  5/31/2019

## 2019-05-31 NOTE — PLAN OF CARE
Problem: Occupational Therapy Goal  Goal: Occupational Therapy Goal  Goals to be met by: 6/12/2019     Patient will increase functional independence with ADLs by performing:    Feeding with [place].  UE Dressing with Modified [place].  LE Dressing with Modified [place].  Grooming while seated with Modified [place].  Toileting from toilet with Set-up Assistance for hygiene and clothing management.   Supine to sit with Modified [place].  Step transfer with Stand-by Assistance  Toilet transfer to toilet with Contact Guard Assistance. Met 5/30  Upper extremity exercise program with assistance as needed.      Outcome: Ongoing (interventions implemented as appropriate)  Patient is making good progress toward OT goals and will benefit from continued OT to address functional deficits.

## 2019-05-31 NOTE — NURSING
Report received from CLIFF Hull. Patient awake and alert. NAD noted, family at bedside. Safety maintained with telesitter in place. Will continue to monitor.

## 2019-05-31 NOTE — PHYSICIAN QUERY
"PT Name: Jeanie Malhotra  MR #: [id number]     PHYSICIAN QUERY -  ACUITY OF CONDITION CLARIFICATION      Juliette Nicole RN, CCDS  Desk # [phone]; lynn # [phone] [email or web address]    This form is a permanent document in the medical record.     Query Date: May 31, 2019    By submitting this query, we are merely seeking further clarification of documentation to reflect the severity of illness of your patient. Please utilize your independent clinical judgment when addressing the question(s) below.    The Medical record reflects the following:   Indicators                         Supporting Clinical Findings Location in Medical Record   x Documentation of condition wo gastric cardia ulcers GI PN 5/29   x Lab Value(s) PRBC x2   H&H: 5.8/19.8 on 5/27 Blood [facility] 5/28  Lab     Radiology Findings       x Treatment               Medication A Helicobacter pylori stool antigen test will be checked.    NSAID cessation was emphasized.      She can remain on the protonix and octreotide drips for one more day then protonix Q12H.      Antibiotics can be continued for a 7 day course.    An EGD can be repeated in 2 months to ensure ulcer healing.       EGD in which she had a grade I varix, portal hypertensive gastropathy, and two gastric cardia ulcers, one of which appeared to have bled as it had an overlying clot.  These were injected with epinephrine.  As they were seen on full retroflexion, cautery and hemoclip placement were unable to be performed.   GI PN 5/29   x Other Acute UGIB likely d/t esophageal varices 2/2 liver cirrhosis   Anemia due to cirrhosis of the liver and acute GIB  Symptomatic anemia - Secondary to acute blood loss eICU [facility] 5/28      Provider, please specify the acuity/chronicity of  "Gastric Ulcer"    [   ] Acute   [ x  ] Chronic   [   ] Acute and/on chronic   [   ]  Clinically Undetermined     Please document in your progress notes daily for the duration of treatment until resolved, and include in " your discharge summary.

## 2019-06-01 LAB
ALBUMIN SERPL BCP-MCNC: 2.5 G/DL (ref 3.5–5.2)
ALP SERPL-CCNC: 108 U/L (ref 55–135)
ALT SERPL W/O P-5'-P-CCNC: 8 U/L (ref 10–44)
AMMONIA PLAS-SCNC: 46 UMOL/L (ref 10–50)
ANION GAP SERPL CALC-SCNC: 9 MMOL/L (ref 8–16)
AST SERPL-CCNC: 49 U/L (ref 10–40)
BACTERIA BLD CULT: NORMAL
BACTERIA SPEC AEROBE CULT: NO GROWTH
BACTERIA SPEC ANAEROBE CULT: NORMAL
BASOPHILS # BLD AUTO: 0.01 K/UL (ref 0–0.2)
BASOPHILS NFR BLD: 0.2 % (ref 0–1.9)
BILIRUB SERPL-MCNC: 1.2 MG/DL (ref 0.1–1)
BUN SERPL-MCNC: 16 MG/DL (ref 8–23)
CALCIUM SERPL-MCNC: 7.7 MG/DL (ref 8.7–10.5)
CHLORIDE SERPL-SCNC: 105 MMOL/L (ref 95–110)
CO2 SERPL-SCNC: 24 MMOL/L (ref 23–29)
CREAT SERPL-MCNC: 1.3 MG/DL (ref 0.5–1.4)
DIFFERENTIAL METHOD: ABNORMAL
EOSINOPHIL # BLD AUTO: 0 K/UL (ref 0–0.5)
EOSINOPHIL NFR BLD: 0.5 % (ref 0–8)
ERYTHROCYTE [DISTWIDTH] IN BLOOD BY AUTOMATED COUNT: 15.7 % (ref 11.5–14.5)
EST. GFR  (AFRICAN AMERICAN): 50 ML/MIN/1.73 M^2
EST. GFR  (NON AFRICAN AMERICAN): 43 ML/MIN/1.73 M^2
GLUCOSE SERPL-MCNC: 151 MG/DL (ref 70–110)
HCT VFR BLD AUTO: 26 % (ref 37–48.5)
HGB BLD-MCNC: 8.4 G/DL (ref 12–16)
LYMPHOCYTES # BLD AUTO: 0.7 K/UL (ref 1–4.8)
LYMPHOCYTES NFR BLD: 15 % (ref 18–48)
MCH RBC QN AUTO: 29.6 PG (ref 27–31)
MCHC RBC AUTO-ENTMCNC: 32.3 G/DL (ref 32–36)
MCV RBC AUTO: 92 FL (ref 82–98)
MONOCYTES # BLD AUTO: 0.7 K/UL (ref 0.3–1)
MONOCYTES NFR BLD: 15.7 % (ref 4–15)
NEUTROPHILS # BLD AUTO: 3 K/UL (ref 1.8–7.7)
NEUTROPHILS NFR BLD: 68.6 % (ref 38–73)
PLATELET # BLD AUTO: 133 K/UL (ref 150–350)
PMV BLD AUTO: 9.9 FL (ref 9.2–12.9)
POCT GLUCOSE: 173 MG/DL (ref 70–110)
POCT GLUCOSE: 199 MG/DL (ref 70–110)
POCT GLUCOSE: 235 MG/DL (ref 70–110)
POCT GLUCOSE: 260 MG/DL (ref 70–110)
POTASSIUM SERPL-SCNC: 3.1 MMOL/L (ref 3.5–5.1)
PROT SERPL-MCNC: 5.8 G/DL (ref 6–8.4)
RBC # BLD AUTO: 2.84 M/UL (ref 4–5.4)
SODIUM SERPL-SCNC: 138 MMOL/L (ref 136–145)
TSH SERPL DL<=0.005 MIU/L-ACNC: 1.55 UIU/ML (ref 0.4–4)
VIT B12 SERPL-MCNC: 334 PG/ML (ref 210–950)
WBC # BLD AUTO: 4.33 K/UL (ref 3.9–12.7)

## 2019-06-01 PROCEDURE — 25000003 PHARM REV CODE 250: Performed by: HOSPITALIST

## 2019-06-01 PROCEDURE — 84443 ASSAY THYROID STIM HORMONE: CPT

## 2019-06-01 PROCEDURE — 21400001 HC TELEMETRY ROOM

## 2019-06-01 PROCEDURE — 94761 N-INVAS EAR/PLS OXIMETRY MLT: CPT

## 2019-06-01 PROCEDURE — 36415 COLL VENOUS BLD VENIPUNCTURE: CPT

## 2019-06-01 PROCEDURE — 94640 AIRWAY INHALATION TREATMENT: CPT

## 2019-06-01 PROCEDURE — 82607 VITAMIN B-12: CPT

## 2019-06-01 PROCEDURE — 63600175 PHARM REV CODE 636 W HCPCS: Performed by: HOSPITALIST

## 2019-06-01 PROCEDURE — 85025 COMPLETE CBC W/AUTO DIFF WBC: CPT

## 2019-06-01 PROCEDURE — A4216 STERILE WATER/SALINE, 10 ML: HCPCS | Performed by: HOSPITALIST

## 2019-06-01 PROCEDURE — 82140 ASSAY OF AMMONIA: CPT

## 2019-06-01 PROCEDURE — 80053 COMPREHEN METABOLIC PANEL: CPT

## 2019-06-01 RX ORDER — POTASSIUM CHLORIDE 20 MEQ/1
40 TABLET, EXTENDED RELEASE ORAL ONCE
Status: COMPLETED | OUTPATIENT
Start: 2019-06-01 | End: 2019-06-01

## 2019-06-01 RX ADMIN — FLUTICASONE FUROATE AND VILANTEROL TRIFENATATE 1 PUFF: 200; 25 POWDER RESPIRATORY (INHALATION) at 08:06

## 2019-06-01 RX ADMIN — LACTULOSE 30 G: 20 SOLUTION ORAL at 08:06

## 2019-06-01 RX ADMIN — Medication 10 ML: at 01:06

## 2019-06-01 RX ADMIN — CARBIDOPA AND LEVODOPA 2 TABLET: 25; 100 TABLET, EXTENDED RELEASE ORAL at 08:06

## 2019-06-01 RX ADMIN — PANTOPRAZOLE SODIUM 40 MG: 40 TABLET, DELAYED RELEASE ORAL at 10:06

## 2019-06-01 RX ADMIN — LACTULOSE 30 G: 20 SOLUTION ORAL at 10:06

## 2019-06-01 RX ADMIN — RIFAXIMIN 200 MG: 200 TABLET ORAL at 08:06

## 2019-06-01 RX ADMIN — FUROSEMIDE 40 MG: 40 TABLET ORAL at 10:06

## 2019-06-01 RX ADMIN — INSULIN ASPART 4 UNITS: 100 INJECTION, SOLUTION INTRAVENOUS; SUBCUTANEOUS at 03:06

## 2019-06-01 RX ADMIN — POTASSIUM CHLORIDE 40 MEQ: 1500 TABLET, EXTENDED RELEASE ORAL at 10:06

## 2019-06-01 RX ADMIN — MORPHINE SULFATE 2 MG: 10 INJECTION INTRAVENOUS at 04:06

## 2019-06-01 RX ADMIN — AMITRIPTYLINE HYDROCHLORIDE 25 MG: 25 TABLET, FILM COATED ORAL at 08:06

## 2019-06-01 RX ADMIN — INSULIN ASPART 3 UNITS: 100 INJECTION, SOLUTION INTRAVENOUS; SUBCUTANEOUS at 09:06

## 2019-06-01 RX ADMIN — LEVETIRACETAM 500 MG: 500 TABLET ORAL at 10:06

## 2019-06-01 RX ADMIN — CEFTRIAXONE 1 G: 1 INJECTION, SOLUTION INTRAVENOUS at 01:06

## 2019-06-01 RX ADMIN — QUETIAPINE FUMARATE 25 MG: 25 TABLET ORAL at 08:06

## 2019-06-01 RX ADMIN — Medication 10 ML: at 06:06

## 2019-06-01 RX ADMIN — Medication 10 ML: at 12:06

## 2019-06-01 RX ADMIN — PANTOPRAZOLE SODIUM 40 MG: 40 TABLET, DELAYED RELEASE ORAL at 09:06

## 2019-06-01 RX ADMIN — LEVETIRACETAM 500 MG: 500 TABLET ORAL at 08:06

## 2019-06-01 RX ADMIN — SPIRONOLACTONE 100 MG: 25 TABLET ORAL at 10:06

## 2019-06-01 RX ADMIN — RIFAXIMIN 200 MG: 200 TABLET ORAL at 09:06

## 2019-06-01 NOTE — ASSESSMENT & PLAN NOTE
Pt also with some cognitive impairment due to hepatic encephalopathy with ammonia level of 135    Lactulose and Rifaximin started. Patient is also on low Na diet and on lasix and spironolactone.  Much improved ammonia, but patient remained with confusion.  Symptoms worse at night.  Symptoms possibly related to acute delirium/sundowning from hospitalization.  Delirium precautions.  Started on Seroquel.    Seems better, but slight confusion.  Family states that symptoms were presenting prior to acute issues.  Would like to get Neuro evaluation.  Unremarkable Head CT.  Looks well.  Will try for possible discharge tomorrow.

## 2019-06-01 NOTE — SUBJECTIVE & OBJECTIVE
Interval History: Looks well.  No new complaints.    Review of Systems   HENT: Negative for ear discharge and ear pain.    Eyes: Negative for pain and itching.   Endocrine: Negative for polyphagia and polyuria.   Neurological: Negative for seizures and syncope.     Objective:     Vital Signs (Most Recent):  Temp: 98.3 °F (36.8 °C) (06/01/19 1145)  Pulse: 77 (06/01/19 1145)  Resp: 18 (06/01/19 1145)  BP: (!) 141/65 (06/01/19 1145)  SpO2: 96 % (06/01/19 1145) Vital Signs (24h Range):  Temp:  [98 °F (36.7 °C)-99.7 °F (37.6 °C)] 98.3 °F (36.8 °C)  Pulse:  [77-97] 77  Resp:  [18] 18  SpO2:  [95 %-99 %] 96 %  BP: (113-141)/(56-85) 141/65     Weight: 62 kg (136 lb 11 oz)  Body mass index is 27.61 kg/m².  No intake or output data in the 24 hours ending 06/01/19 1256   Physical Exam   Constitutional: She is oriented to person, place, and time. No distress.   Older than stated age    HENT:   Head: Normocephalic and atraumatic.   Right Ear: External ear normal.   Mouth/Throat: No oropharyngeal exudate.   Eyes: Pupils are equal, round, and reactive to light. Conjunctivae and EOM are normal. Right eye exhibits no discharge. Left eye exhibits no discharge. No scleral icterus.   No scleral icterus    Neck: Normal range of motion. No JVD present. No tracheal deviation present. No thyromegaly present.   Cardiovascular: Normal rate, regular rhythm and normal heart sounds. Exam reveals no friction rub.   No murmur heard.  Pulmonary/Chest: Effort normal. No respiratory distress. She has no wheezes.   Decreased breath sounds at bases, R > L   Abdominal: Soft. Bowel sounds are normal. She exhibits no mass. There is no tenderness. There is no rebound and no guarding. No hernia.   + ascites   Musculoskeletal: Normal range of motion. She exhibits no tenderness or deformity.   Trace edema to LE bilaterally   Lymphadenopathy:     She has no cervical adenopathy.   Neurological: She is alert and oriented to person, place, and time. No sensory  deficit. She exhibits normal muscle tone. Coordination normal.   Skin: Skin is warm and dry. Capillary refill takes less than 2 seconds. No rash noted. She is not diaphoretic. No erythema. No pallor.   Psychiatric: Her mood appears anxious. Her speech is rapid and/or pressured. Cognition and memory are impaired. She is inattentive.   Nursing note and vitals reviewed.      Significant Labs:   BMP:   Recent Labs   Lab 06/01/19  0533   *      K 3.1*      CO2 24   BUN 16   CREATININE 1.3   CALCIUM 7.7*     CBC:   Recent Labs   Lab 05/31/19  0844 06/01/19  0533   WBC 3.99 4.33   HGB 8.3* 8.4*   HCT 25.8* 26.0*   * 133*

## 2019-06-01 NOTE — PLAN OF CARE
Problem: Fall Injury Risk  Goal: Absence of Fall and Fall-Related Injury  Outcome: Ongoing (interventions implemented as appropriate)    Reviewed Plan of care with Pt. And son.  Both verbalized understanding.  Pt has AVAS monitoring at bedside.  No fall or injuries.  Will continue to monitor Pts care.

## 2019-06-01 NOTE — NURSING
Report received from CLIFF Hull. Patient awake and alert. NAD noted, spouse at bedside. Safety maintained with telesitter in place. Will continue to monitor.

## 2019-06-01 NOTE — PLAN OF CARE
Problem: Fall Injury Risk  Goal: Absence of Fall and Fall-Related Injury    Intervention: Identify and Manage Contributors to Fall Injury Risk     06/01/19 0447   Manage Acute Allergic Reaction   Medication Review/Management medications reviewed;high risk medications identified   Identify and Manage Contributors to Fall Injury Risk   Self-Care Promotion independence encouraged;BADL personal objects within reach;BADL personal routines maintained;meal setup provided     Intervention: Promote Injury-Free Environment     06/01/19 0447   Optimize [place] and Functional Mobility   Environmental Safety Modification assistive device/personal items within reach;clutter free environment maintained;room near unit station;room organization consistent   Optimize Balance and Safe Activity   Safety Promotion/Fall Prevention assistive device/personal item within reach;bed alarm set;medications reviewed;nonskid shoes/socks when out of bed;room near unit station;/camera at bedside;side rails raised x 3;instructed to call staff for mobility;high risk medications identified         Problem: Diabetes Comorbidity  Goal: Blood Glucose Level Within Desired Range    Intervention: Maintain Glycemic Control     06/01/19 0447   Monitor and Manage Ketoacidosis   Glycemic Management blood glucose monitoring

## 2019-06-01 NOTE — PROGRESS NOTES
[facility] Hospital Medicine  Progress Note    Patient Name: Jeanie Malhotra  MRN: [id number]  Patient Class: IP- Inpatient   Admission Date: 5/27/2019  Length of Stay: 5 days  Attending Physician: Tuan Barnes MD  Primary Care Provider: Jaxon Sharp MD        Subjective:     Principal Problem:Acute upper GI bleeding    HPI:  Jeanie Malhotra is a 63 y.o. female that (in part)  has a past medical history of Diabetes mellitus, GERD (gastroesophageal reflux disease), and Hypertension.  has a past surgical history that includes Breast cyst excision and Abdominal surgery. Presents to [facility] Emergency Department complaining of rectal bleeding which started earlier today.  She has had increasing abdominal distension over the last week with progressive worsening over the weekend.  She was previously undergoing evaluation for liver dysfunction and was diagnosed with hepatitis C and February of this year.  However, she lost her insurance and has not been able to follow-up as recommended.  Denies fever chills.  Positive for confusion.  Patient is not the best historian due to suspected metabolic encephalopathy.  She has had dyspnea with exertion and shortness of breath at rest.  Increasing skin pallor, easy bruising, and loose stools.  Positive for melena and hematochezia.  Patient has history of abdominal surgery occurring over 10 years ago but cannot specify exactly what was performed.  History is otherwise unreliable in limited at this time.    In the emergency department routine laboratory studies were obtained and rectal exam was performed with evidence of Hemoccult-positive stool.  She had a significantly decreased hemoglobin of 5.8 and elevated lactic acid level.    Hospital medicine has been asked to admit for further evaluation and treatment.     Hospital Course:  Ms. Malhotra was admitted with an UGIB with an initial H/H of 5.8/19.8. She was recently diagnosed with  cirrhosis secondary to hepatitis C. Pt was empirically transfused 2 U PRBC and was started on PPI and octreotide gtt, and Rocephin for prophylaxis. Pulmonary performed paracentesis with removal of 1720 mL of ascitic fluid prior to patient undergoing EGD by GI on 5/28/19. EGD showed non-bleeding grade I esophageal varices. Two non-bleeding gastric ulcers with adherent clot. Injected. Due to the location of the ulcers seen only on complete retroflexion, cautery and hemoclip placement were unable to be performed. Portal hypertensive gastropathy. Normal examined duodenum. Patient returned to ICU for continued monitoring after procedure. H/H increased to 9.6/31.0 which was higher than expected likely reflecting previous level was higher than reported given persistently high levels that remained stable post transfusion. Ascitic fluid negative for SBP and consistent with portal HTN. Pt also with some cognitive impairment due to hepatic encephalopathy with ammonia level of 135  Lactulose started on 5/28. Patient is also on low Na diet along with lasix and spironolactone for her ascites. Improvement of Ammonia, but patient still with confusion.  Probably some degree of acute delirium. Started on Seroquel at night.    Interval History: Looks well.  No new complaints.    Review of Systems   HENT: Negative for ear discharge and ear pain.    Eyes: Negative for pain and itching.   Endocrine: Negative for polyphagia and polyuria.   Neurological: Negative for seizures and syncope.     Objective:     Vital Signs (Most Recent):  Temp: 98.3 °F (36.8 °C) (06/01/19 1145)  Pulse: 77 (06/01/19 1145)  Resp: 18 (06/01/19 1145)  BP: (!) 141/65 (06/01/19 1145)  SpO2: 96 % (06/01/19 1145) Vital Signs (24h Range):  Temp:  [98 °F (36.7 °C)-99.7 °F (37.6 °C)] 98.3 °F (36.8 °C)  Pulse:  [77-97] 77  Resp:  [18] 18  SpO2:  [95 %-99 %] 96 %  BP: (113-141)/(56-85) 141/65     Weight: 62 kg (136 lb 11 oz)  Body mass index is 27.61 kg/m².  No intake or  output data in the 24 hours ending 06/01/19 1256   Physical Exam   Constitutional: She is oriented to person, place, and time. No distress.   Older than stated age    HENT:   Head: Normocephalic and atraumatic.   Right Ear: External ear normal.   Mouth/Throat: No oropharyngeal exudate.   Eyes: Pupils are equal, round, and reactive to light. Conjunctivae and EOM are normal. Right eye exhibits no discharge. Left eye exhibits no discharge. No scleral icterus.   No scleral icterus    Neck: Normal range of motion. No JVD present. No tracheal deviation present. No thyromegaly present.   Cardiovascular: Normal rate, regular rhythm and normal heart sounds. Exam reveals no friction rub.   No murmur heard.  Pulmonary/Chest: Effort normal. No respiratory distress. She has no wheezes.   Decreased breath sounds at bases, R > L   Abdominal: Soft. Bowel sounds are normal. She exhibits no mass. There is no tenderness. There is no rebound and no guarding. No hernia.   + ascites   Musculoskeletal: Normal range of motion. She exhibits no tenderness or deformity.   Trace edema to LE bilaterally   Lymphadenopathy:     She has no cervical adenopathy.   Neurological: She is alert and oriented to person, place, and time. No sensory deficit. She exhibits normal muscle tone. Coordination normal.   Skin: Skin is warm and dry. Capillary refill takes less than 2 seconds. No rash noted. She is not diaphoretic. No erythema. No pallor.   Psychiatric: Her mood appears anxious. Her speech is rapid and/or pressured. Cognition and memory are impaired. She is inattentive.   Nursing note and vitals reviewed.      Significant Labs:   BMP:   Recent Labs   Lab 06/01/19  0533   *      K 3.1*      CO2 24   BUN 16   CREATININE 1.3   CALCIUM 7.7*     CBC:   Recent Labs   Lab 05/31/19  0844 06/01/19  0533   WBC 3.99 4.33   HGB 8.3* 8.4*   HCT 25.8* 26.0*   * 133*         Assessment/Plan:      * Acute upper GI bleeding  Initial H/H of  5.8/19.8 with underlying cirrhosis secondary to hepatitis C.   Pt was empirically transfused 2 U PRBC  Started on PPI and octreotide gtt.  S/p paracentesis with removal of 1720 mL of ascitic fluid.  EGD on 5/28 showed non-bleeding grade I esophageal varices. Two non-bleeding gastric ulcers with adherent clot. Injected. Due to the location of the ulcers seen only on complete retroflexion, cautery and hemoclip placement were unable to be performed. Portal hypertensive gastropathy.  H/H increased to 9.6/31 post transfusion.   Ascitic fluid negative for SBP and consistent with portal HTN.   U/S showed cirrhosis   Some drop in H/H, but no further bleeding.  Continue to monitor.  Stop drips and change to oral PPI.  PT/OT evaluation.    Hepatic encephalopathy  Pt also with some cognitive impairment due to hepatic encephalopathy with ammonia level of 135    Lactulose and Rifaximin started. Patient is also on low Na diet and on lasix and spironolactone.  Much improved ammonia, but patient remained with confusion.  Symptoms worse at night.  Symptoms possibly related to acute delirium/sundowning from hospitalization.  Delirium precautions.  Started on Seroquel.    Seems better, but slight confusion.  Family states that symptoms were presenting prior to acute issues.  Would like to get Neuro evaluation.  Unremarkable Head CT.  Looks well.  Will try for possible discharge tomorrow.    Multiple gastric ulcers  Seen on EGD  As discussed above    Esophageal varices without bleeding  Seen on EGD  As discussed above    Debility  PT/OT to evaluate and treat    Cirrhosis of liver with ascites  As expected and seen on U/S  S/p paracentesis and on appropriate mediations as discussed above    Ascites  S/p paracentesis  Negative for SBP  Continue Low Na diet with lasix and spironolactone    Hepatitis C  Hep C positive, treatment naive and genotype pending  Will need outpatient follow up with GI for management and possible  treatment.    Symptomatic anemia  As discussed above under GIB    Diabetes mellitus, type 2  HgbA1C 6.3  Glucose control with SSI and diet   Will continue to monitor      VTE Risk Mitigation (From admission, onward)        Ordered     Place sequential compression device  Until discontinued      05/28/19 0146     IP VTE LOW RISK PATIENT  Once      05/27/19 0467              Tuan Barnes MD  Department of Hospital Medicine   [facility]

## 2019-06-02 VITALS
OXYGEN SATURATION: 91 % | HEIGHT: 59 IN | BODY MASS INDEX: 27.87 KG/M2 | WEIGHT: 138.25 LBS | SYSTOLIC BLOOD PRESSURE: 126 MMHG | TEMPERATURE: 98 F | HEART RATE: 89 BPM | RESPIRATION RATE: 18 BRPM | DIASTOLIC BLOOD PRESSURE: 74 MMHG

## 2019-06-02 PROBLEM — K76.82 HEPATIC ENCEPHALOPATHY: Status: RESOLVED | Noted: 2019-05-29 | Resolved: 2019-06-02

## 2019-06-02 PROBLEM — K92.2 ACUTE UPPER GI BLEEDING: Status: RESOLVED | Noted: 2019-05-28 | Resolved: 2019-06-02

## 2019-06-02 PROBLEM — D64.9 SYMPTOMATIC ANEMIA: Status: RESOLVED | Noted: 2019-05-28 | Resolved: 2019-06-02

## 2019-06-02 LAB
ALBUMIN SERPL BCP-MCNC: 2.7 G/DL (ref 3.5–5.2)
ALP SERPL-CCNC: 105 U/L (ref 55–135)
ALT SERPL W/O P-5'-P-CCNC: 6 U/L (ref 10–44)
AMMONIA PLAS-SCNC: 36 UMOL/L (ref 10–50)
ANION GAP SERPL CALC-SCNC: 9 MMOL/L (ref 8–16)
AST SERPL-CCNC: 51 U/L (ref 10–40)
BASOPHILS # BLD AUTO: 0.02 K/UL (ref 0–0.2)
BASOPHILS NFR BLD: 0.3 % (ref 0–1.9)
BILIRUB SERPL-MCNC: 1.3 MG/DL (ref 0.1–1)
BUN SERPL-MCNC: 13 MG/DL (ref 8–23)
CALCIUM SERPL-MCNC: 7.8 MG/DL (ref 8.7–10.5)
CHLORIDE SERPL-SCNC: 102 MMOL/L (ref 95–110)
CO2 SERPL-SCNC: 25 MMOL/L (ref 23–29)
CREAT SERPL-MCNC: 1.2 MG/DL (ref 0.5–1.4)
DIFFERENTIAL METHOD: ABNORMAL
EOSINOPHIL # BLD AUTO: 0.1 K/UL (ref 0–0.5)
EOSINOPHIL NFR BLD: 1.1 % (ref 0–8)
ERYTHROCYTE [DISTWIDTH] IN BLOOD BY AUTOMATED COUNT: 15.9 % (ref 11.5–14.5)
EST. GFR  (AFRICAN AMERICAN): 55 ML/MIN/1.73 M^2
EST. GFR  (NON AFRICAN AMERICAN): 48 ML/MIN/1.73 M^2
GLUCOSE SERPL-MCNC: 114 MG/DL (ref 70–110)
HCT VFR BLD AUTO: 26.6 % (ref 37–48.5)
HGB BLD-MCNC: 8.5 G/DL (ref 12–16)
LYMPHOCYTES # BLD AUTO: 0.8 K/UL (ref 1–4.8)
LYMPHOCYTES NFR BLD: 12.6 % (ref 18–48)
MCH RBC QN AUTO: 29.2 PG (ref 27–31)
MCHC RBC AUTO-ENTMCNC: 32 G/DL (ref 32–36)
MCV RBC AUTO: 91 FL (ref 82–98)
MONOCYTES # BLD AUTO: 1.2 K/UL (ref 0.3–1)
MONOCYTES NFR BLD: 18.8 % (ref 4–15)
NEUTROPHILS # BLD AUTO: 4.4 K/UL (ref 1.8–7.7)
NEUTROPHILS NFR BLD: 67.2 % (ref 38–73)
PLATELET # BLD AUTO: 156 K/UL (ref 150–350)
PMV BLD AUTO: 10.1 FL (ref 9.2–12.9)
POCT GLUCOSE: 179 MG/DL (ref 70–110)
POCT GLUCOSE: 232 MG/DL (ref 70–110)
POTASSIUM SERPL-SCNC: 3.1 MMOL/L (ref 3.5–5.1)
PROT SERPL-MCNC: 6 G/DL (ref 6–8.4)
RBC # BLD AUTO: 2.91 M/UL (ref 4–5.4)
SODIUM SERPL-SCNC: 136 MMOL/L (ref 136–145)
WBC # BLD AUTO: 6.5 K/UL (ref 3.9–12.7)

## 2019-06-02 PROCEDURE — 25000003 PHARM REV CODE 250: Performed by: INTERNAL MEDICINE

## 2019-06-02 PROCEDURE — 80053 COMPREHEN METABOLIC PANEL: CPT

## 2019-06-02 PROCEDURE — A4216 STERILE WATER/SALINE, 10 ML: HCPCS | Performed by: HOSPITALIST

## 2019-06-02 PROCEDURE — 25000003 PHARM REV CODE 250: Performed by: HOSPITALIST

## 2019-06-02 PROCEDURE — 82140 ASSAY OF AMMONIA: CPT

## 2019-06-02 PROCEDURE — 94640 AIRWAY INHALATION TREATMENT: CPT

## 2019-06-02 PROCEDURE — 63600175 PHARM REV CODE 636 W HCPCS: Performed by: HOSPITALIST

## 2019-06-02 PROCEDURE — 85025 COMPLETE CBC W/AUTO DIFF WBC: CPT

## 2019-06-02 PROCEDURE — 36415 COLL VENOUS BLD VENIPUNCTURE: CPT

## 2019-06-02 RX ORDER — LACTULOSE 10 G/15ML
30 SOLUTION ORAL 2 TIMES DAILY
Qty: 900 ML | Refills: 3 | Status: SHIPPED | OUTPATIENT
Start: 2019-06-02 | End: 2019-06-12

## 2019-06-02 RX ORDER — FUROSEMIDE 20 MG/1
20 TABLET ORAL DAILY
Status: DISCONTINUED | OUTPATIENT
Start: 2019-06-03 | End: 2019-06-02 | Stop reason: HOSPADM

## 2019-06-02 RX ORDER — POTASSIUM CHLORIDE 750 MG/1
10 TABLET, EXTENDED RELEASE ORAL DAILY
Qty: 30 TABLET | Refills: 11 | Status: SHIPPED | OUTPATIENT
Start: 2019-06-02

## 2019-06-02 RX ORDER — ACETAMINOPHEN 500 MG
500 TABLET ORAL EVERY 6 HOURS PRN
Status: DISCONTINUED | OUTPATIENT
Start: 2019-06-02 | End: 2019-06-02 | Stop reason: HOSPADM

## 2019-06-02 RX ORDER — SPIRONOLACTONE 100 MG/1
100 TABLET, FILM COATED ORAL DAILY
Qty: 30 TABLET | Refills: 11 | Status: SHIPPED | OUTPATIENT
Start: 2019-06-03 | End: 2020-06-02

## 2019-06-02 RX ORDER — POTASSIUM CHLORIDE 750 MG/1
10 TABLET, EXTENDED RELEASE ORAL DAILY
Status: DISCONTINUED | OUTPATIENT
Start: 2019-06-02 | End: 2019-06-02 | Stop reason: HOSPADM

## 2019-06-02 RX ORDER — PANTOPRAZOLE SODIUM 40 MG/1
40 TABLET, DELAYED RELEASE ORAL 2 TIMES DAILY
Qty: 60 TABLET | Refills: 1 | Status: SHIPPED | OUTPATIENT
Start: 2019-06-02 | End: 2020-06-01

## 2019-06-02 RX ORDER — FUROSEMIDE 20 MG/1
20 TABLET ORAL DAILY
Qty: 30 TABLET | Refills: 11 | Status: SHIPPED | OUTPATIENT
Start: 2019-06-03 | End: 2020-06-02

## 2019-06-02 RX ADMIN — INSULIN ASPART 2 UNITS: 100 INJECTION, SOLUTION INTRAVENOUS; SUBCUTANEOUS at 09:06

## 2019-06-02 RX ADMIN — SPIRONOLACTONE 100 MG: 25 TABLET ORAL at 09:06

## 2019-06-02 RX ADMIN — ACETAMINOPHEN 500 MG: 500 TABLET, FILM COATED ORAL at 03:06

## 2019-06-02 RX ADMIN — FLUTICASONE FUROATE AND VILANTEROL TRIFENATATE 1 PUFF: 200; 25 POWDER RESPIRATORY (INHALATION) at 08:06

## 2019-06-02 RX ADMIN — CEFTRIAXONE 1 G: 1 INJECTION, SOLUTION INTRAVENOUS at 12:06

## 2019-06-02 RX ADMIN — Medication 10 ML: at 12:06

## 2019-06-02 RX ADMIN — POTASSIUM CHLORIDE 10 MEQ: 750 TABLET, EXTENDED RELEASE ORAL at 10:06

## 2019-06-02 RX ADMIN — FUROSEMIDE 40 MG: 40 TABLET ORAL at 09:06

## 2019-06-02 RX ADMIN — RIFAXIMIN 200 MG: 200 TABLET ORAL at 09:06

## 2019-06-02 RX ADMIN — Medication 10 ML: at 10:06

## 2019-06-02 RX ADMIN — INSULIN ASPART 4 UNITS: 100 INJECTION, SOLUTION INTRAVENOUS; SUBCUTANEOUS at 01:06

## 2019-06-02 RX ADMIN — Medication 10 ML: at 05:06

## 2019-06-02 RX ADMIN — LACTULOSE 30 G: 20 SOLUTION ORAL at 09:06

## 2019-06-02 RX ADMIN — PANTOPRAZOLE SODIUM 40 MG: 40 TABLET, DELAYED RELEASE ORAL at 09:06

## 2019-06-02 RX ADMIN — LEVETIRACETAM 500 MG: 500 TABLET ORAL at 09:06

## 2019-06-02 NOTE — NURSING
Noted orders for discharge. All lines removed. Discharge AVS, scripts, appointments, and education provided to patient and patients . Both verbalized understanding. PCT will bring patient to lobby in wheelchair for discharge.

## 2019-06-02 NOTE — DISCHARGE SUMMARY
[facility] Hospital Medicine  Discharge Summary      Patient Name: Jeanie Malhotra  MRN: [id number]  Admission Date: 5/27/2019  Hospital Length of Stay: 6 days  Discharge Date and Time:  06/02/2019 10:58 AM  Attending Physician: Tuan Barnes MD   Discharging Provider: Tuan Barnes MD  Primary Care Provider: Jaxon Sharp MD      HPI:   Jeanie Malhotra is a 63 y.o. female that (in part)  has a past medical history of Diabetes mellitus, GERD (gastroesophageal reflux disease), and Hypertension.  has a past surgical history that includes Breast cyst excision and Abdominal surgery. Presents to [facility] Emergency Department complaining of rectal bleeding which started earlier today.  She has had increasing abdominal distension over the last week with progressive worsening over the weekend.  She was previously undergoing evaluation for liver dysfunction and was diagnosed with hepatitis C and February of this year.  However, she lost her insurance and has not been able to follow-up as recommended.  Denies fever chills.  Positive for confusion.  Patient is not the best historian due to suspected metabolic encephalopathy.  She has had dyspnea with exertion and shortness of breath at rest.  Increasing skin pallor, easy bruising, and loose stools.  Positive for melena and hematochezia.  Patient has history of abdominal surgery occurring over 10 years ago but cannot specify exactly what was performed.  History is otherwise unreliable in limited at this time.    In the emergency department routine laboratory studies were obtained and rectal exam was performed with evidence of Hemoccult-positive stool.  She had a significantly decreased hemoglobin of 5.8 and elevated lactic acid level.    Hospital medicine has been asked to admit for further evaluation and treatment.     Procedure(s) (LRB):  EGD (ESOPHAGOGASTRODUODENOSCOPY) (Left)      Hospital Course:   Ms. Malhotra was admitted  with an UGIB with an initial H/H of 5.8/19.8. She was recently diagnosed with cirrhosis secondary to hepatitis C. Pt was empirically transfused 2 U PRBC and was started on PPI and octreotide gtt, and Rocephin for prophylaxis. Pulmonary performed paracentesis with removal of 1720 mL of ascitic fluid prior to patient undergoing EGD by GI on 5/28/19. EGD showed non-bleeding grade I esophageal varices. Two non-bleeding gastric ulcers with adherent clot. Injected. Due to the location of the ulcers seen only on complete retroflexion, cautery and hemoclip placement were unable to be performed. Portal hypertensive gastropathy. Normal examined duodenum. Patient returned to ICU for continued monitoring after procedure. H/H increased to 9.6/31.0 which was higher than expected likely reflecting previous level was higher than reported given persistently high levels that remained stable post transfusion. Ascitic fluid negative for SBP and consistent with portal HTN. Pt also with some cognitive impairment due to hepatic encephalopathy with ammonia level of 135  Lactulose started on 5/28. Patient is also on low Na diet along with lasix and spironolactone for her ascites. Improvement of Ammonia, but patient still with confusion.  Neurology consulted.  Unremarkable Head CT.  Symptoms consistent with acute delirium/sundowning.  Long discussion with family that symptoms should improve once patient returns back home.  Patient has remained afebrile and hemodynamically stable.  H/H has remained stable.  Patient will be discharged home with  and follow up with PCP and GI.     Consults:   Consults (From admission, onward)        Status Ordering Provider     Inpatient consult to Gastroenterology  Once     Provider:  Jairo Joy MD    Completed VIOLET PAULINO     Inpatient consult to Neurology  Once     Provider:  Leif Esquivel MD    Completed KENNETH NEWSOME     [facility]  Once     Provider:  (Not  "yet assigned)    Completed KAZ MUNIZ     Inpatient consult to Pulmonology  Once     Provider:  Gonzalez Valladares MD    Completed KAZ MUNIZ     Inpatient consult to Social Work/Case Management  Once     Provider:  (Not yet assigned)    Completed KAZ MUNIZ     IP consult to case management  Once     Provider:  (Not yet assigned)    Completed VIOLET PAULINO          No new Assessment & Plan notes have been filed under this hospital service since the last note was generated.  Service: Hospital Medicine    Final Active Diagnoses:    Diagnosis Date Noted POA    Debility [R53.81] 05/29/2019 Yes    Esophageal varices without bleeding [I85.00] 05/29/2019 Yes    Multiple gastric ulcers [K25.9] 05/29/2019 Yes    Hepatitis C [B19.20] 05/28/2019 Yes    Ascites [R18.8] 05/28/2019 Yes    Cirrhosis of liver with ascites [K74.60, R18.8] 05/28/2019 Yes    Diabetes mellitus, type 2 [E11.9] 12/26/2012 Yes      Problems Resolved During this Admission:    Diagnosis Date Noted Date Resolved POA    PRINCIPAL PROBLEM:  Acute upper GI bleeding [K92.2] 05/28/2019 06/02/2019 Yes    Hepatic encephalopathy [K72.90] 05/29/2019 06/02/2019 Yes    Symptomatic anemia [D64.9] 05/28/2019 06/02/2019 Yes       Discharged Condition: stable    Disposition: Home-Health Care [facility]    Follow Up:  Follow-up Information     Jaxon Sharp MD. Go on 6/5/2019.    Specialty:  Family Medicine  Why:  Hospital Follow  Up on June 5 at 9:45am.  Contact information:  [address]  [address]  [employer]  [facility] [address] [phone]             Kalin James MD In 2 weeks.    Specialty:  Gastroenterology  Contact information:  [address] [place] [address]  [address]  [facility]  [facility] [address] [phone]                 Patient Instructions:      COMMODE FOR HOME USE     Order Specific Question Answer Comments   Type: Standard    Height: 4' 11" (1.499 m)    Weight: 62.7 kg (138 lb 3.7 " oz)    Does patient have medical equipment at home? walker, rolling    Does patient have medical equipment at home? rollator    Does patient have medical equipment at home? grab bar    Does patient have medical equipment at home? cane, straight    Length of need (1-99 months): 99      Diet Cardiac     Notify your health care provider if you experience any of the following:  temperature >100.4     Notify your health care provider if you experience any of the following:  persistent nausea and vomiting or diarrhea     Notify your health care provider if you experience any of the following:  severe uncontrolled pain     Notify your health care provider if you experience any of the following:  difficulty breathing or increased cough     Notify your health care provider if you experience any of the following:  persistent dizziness, light-headedness, or visual disturbances     Notify your health care provider if you experience any of the following:  increased confusion or weakness     Activity as tolerated         Pending Diagnostic Studies:     Procedure Component Value Units Date/Time    Cytology Specimen-Medical Cytology (Fluid/Wash/Brush) [[id number]] Collected:  05/28/19 1245    Order Status:  Sent Lab Status:  No result     Specimen:  Ascites          Medications:  Reconciled Home Medications:      Medication List      START taking these medications    furosemide 20 MG tablet  Commonly known as:  LASIX  Take 1 tablet (20 mg total) by mouth once daily.  Start taking on:  6/3/2019     lactulose 20 gram/30 mL Soln  Commonly known as:  CHRONULAC  Take 45 mLs (30 g total) by mouth 2 (two) times daily. for 10 days     pantoprazole 40 MG tablet  Commonly known as:  PROTONIX  Take 1 tablet (40 mg total) by mouth 2 (two) times daily.     potassium chloride SA 10 MEQ tablet  Commonly known as:  K-DUR,KLOR-CON  Take 1 tablet (10 mEq total) by mouth once daily.     spironolactone 100 MG tablet  Commonly known as:  ALDACTONE  Take  1 tablet (100 mg total) by mouth once daily.  Start taking on:  6/3/2019        CONTINUE taking these medications    ADVAIR DISKUS 250-50 mcg/dose diskus inhaler  Generic drug:  fluticasone-salmeterol 250-50 mcg/dose  USE 1 INHALATION BY MOUTH TWICE A DAY     ALPRAZolam 0.25 MG tablet  Commonly known as:  XANAX  TAKE 1 TABLET BY MOUTH 3 TIMES A DAY AS NEEDED FOR ANXIETY     amitriptyline 25 MG tablet  Commonly known as:  ELAVIL  Take 25 mg by mouth every evening.     atorvastatin 40 MG tablet  Commonly known as:  LIPITOR  Take 40 mg by mouth once daily.     butalbital-acetaminophen-caffeine -40 mg -40 mg per tablet  Commonly known as:  FIORICET, ESGIC  Take 1 tablet by mouth once daily.     carbidopa-levodopa  mg  mg Tbsr  Commonly known as:  SINEMET CR  Take 2 tablets by mouth nightly.     EASY TOUCH TWIST LANCETS 33 gauge Misc  Generic drug:  lancets  USE TO CHECK BLOOD SUGAR DAILY     levETIRAcetam 250 MG Tab  Commonly known as:  KEPPRA  Take 500 mg by mouth 2 (two) times daily.     losartan 25 MG tablet  Commonly known as:  COZAAR  Take 25 mg by mouth once daily.     metFORMIN 1000 MG tablet  Commonly known as:  GLUCOPHAGE  Take 1,000 mg by mouth 2 (two) times daily with meals.     TRUETEST TEST STRIPS Strp  Generic drug:  blood sugar diagnostic  USE TO CHECK BLOOD SUGAR DAILY     VENTOLIN HFA 90 mcg/actuation inhaler  Generic drug:  albuterol  Inhale 2 puffs into the lungs every 4 (four) hours as needed.     VICTOZA 2-ANABEL SUBQ  Inject into the skin.     ZOSTAVAX (PF) 19,400 unit/0.65 mL injection  Generic drug:  zoster vaccine live (PF)        STOP taking these medications    aspirin 81 MG EC tablet  Commonly known as:  ECOTRIN     candesartan-hydrochlorothiazid 32-12.5 mg per tablet  Commonly known as:  ATACAND HCT     HYDROcodone-acetaminophen  mg per tablet  Commonly known as:  NORCO            Indwelling Lines/Drains at time of discharge:   Lines/Drains/Airways     Peripherally  Inserted Central Catheter Line                  3 days                Time spent on the discharge of patient: >30 minutes  Patient was seen and examined on the date of discharge and determined to be suitable for discharge.         Tuan Barnes MD  Department of Hospital Medicine  [facility]

## 2019-06-02 NOTE — PLAN OF CARE
Problem: Adult Inpatient Plan of Care  Goal: Plan of Care Review  Outcome: Ongoing (interventions implemented as appropriate)  Report from Cathy ESQUIVEL. Noted patient sitting up in bed awake, alert, and responsive. AVASYS at bedside due to fall risk and confusion. Bed alarm on and room door open. Denies pain and dyspnea. Board updated and patient encouraged to notify staff when assistance is needed. Bed in lowest position and call light within reach. Assessments per flow sheets. Will continue to monitor.

## 2019-06-02 NOTE — PROGRESS NOTES
[facility] CASE MANAGEMENT                  WRITTEN DISCHARGE INFORMATION      APPOINTMENTS AND RESOURCES TO HELP YOU MANAGE YOUR CARE AT HOME BASED ON YOUR PREFERENCES:  (If an appointment is not scheduled for you when you leave the hospital, call your doctor to schedule a follow up visit within a week)  Follow-up Information     Jaxon Sharp MD. Go on 6/5/2019.    Specialty:  Family Medicine  Why:  Hospital Follow  Up on June 5 at 9:45am.  Contact information:  [address]  [address]  [employer]  [place] [address]  [phone]             Kalin James MD In 2 weeks.    Specialty:  Gastroenterology  Contact information:  [address]  [address]  [facility]  [facility] [address]  [phone]             [facility].    Specialty:  Home Health Services  Why:  Home Health  Contact information:  [address] [facility]  [place] [address]  [phone]                     Healthy Living Instructions to HELP MANAGE YOUR CARE AT HOME:  Things You are responsible for:  1.    Getting your prescriptions filled   2.    Taking your medications as directed, DO NOT MISS ANY DOSES!  3.    Following the diet and exercise recommended by your doctor  4.    Going to your follow-up doctor appointment. This is important because it allows the doctor to monitor your progress and determine if any changes need to made to your treatment plan.  5. If you have any questions about MANAGING YOUR CARE AT HOME Call the Nurse Care Line for 24/7 Assistance [phone]       Please answer any calls you may receive from [facility]. We want to continue to support you as you manage your healthcare needs. [facility] is happy to have the opportunity to serve you.      Thank you for choosing [facility] for your healthcare needs!  Your [facility] Case Management Team,

## 2019-06-02 NOTE — PLAN OF CARE
Problem: Fall Injury Risk  Goal: Absence of Fall and Fall-Related Injury  Outcome: Ongoing (interventions implemented as appropriate)  Intervention: Identify and Manage Contributors to Fall Injury Risk     06/02/19 0559   Manage Acute Allergic Reaction   Medication Review/Management medications reviewed;high risk medications identified   Identify and Manage Contributors to Fall Injury Risk   Self-Care Promotion BADL personal objects within reach     Intervention: Promote Injury-Free Environment     06/02/19 0559   Optimize [place] and Functional Mobility   Environmental Safety Modification assistive device/personal items within reach;clutter free environment maintained;room near unit station;room organization consistent   Optimize Balance and Safe Activity   Safety Promotion/Fall Prevention assistive device/personal item within reach;bed alarm set;Fall Risk signage in place;nonskid shoes/socks when out of bed;room near unit station;side rails raised x 3;instructed to call staff for mobility         Problem: Diabetes Comorbidity  Goal: Blood Glucose Level Within Desired Range  Outcome: Ongoing (interventions implemented as appropriate)  Intervention: Maintain Glycemic Control     06/02/19 0559   Monitor and Manage Ketoacidosis   Glycemic Management blood glucose monitoring;supplemental insulin given

## 2019-06-02 NOTE — PLAN OF CARE
[facility]    HOME HEALTH ORDERS  FACE TO FACE ENCOUNTER    Patient Name: Jeanie Malhotra  YOB: 1955    PCP: Jaxon Sharp MD   PCP Address: [address] [place] [address] [address] [employer] [address]  PCP Phone Number: [phone]  PCP Fax: [phone]       Encounter Date: 06/02/2019    Admit to Home Health    Diagnoses:  Active Hospital Problems    Diagnosis  POA    Debility [R53.81]  Yes    Esophageal varices without bleeding [I85.00]  Yes    Multiple gastric ulcers [K25.9]  Yes    Hepatitis C [B19.20]  Yes    Ascites [R18.8]  Yes    Cirrhosis of liver with ascites [K74.60, R18.8]  Yes    Diabetes mellitus, type 2 [E11.9]  Yes      Resolved Hospital Problems    Diagnosis Date Resolved POA    *Acute upper GI bleeding [K92.2] 06/02/2019 Yes     Priority: 1 - High    Hepatic encephalopathy [K72.90] 06/02/2019 Yes     Priority: 2     Symptomatic anemia [D64.9] 06/02/2019 Yes       No future appointments.  Follow-up Information     Jaxon Sharp MD. Go on 6/5/2019.    Specialty:  Family Medicine  Why:  Hospital Follow  Up on June 5 at 9:45am.  Contact information:  [address] [place] [address] [address]  [employer]  [facility] [id number] [phone]             Kalin James MD In 2 weeks.    Specialty:  Gastroenterology  Contact information:  [address] [place] [address]  [id number]  [facility]  [facility] [id number] [phone]                     I have seen and examined this patient face to face today. My clinical findings that support the need for the home health skilled services and home bound status are the following:  Weakness/numbness causing balance and gait disturbance due to Liver Disease making it taxing to leave home.    Allergies:  Review of patient's allergies indicates:   Allergen Reactions    Contrast media        Diet: cardiac diet and fluid restriction: 1500 ml    Activities: activity as tolerated    Nursing:    SN to complete comprehensive assessment including routine vital signs. Instruct on disease process and s/s of complications to report to MD. Review/verify medication list sent home with the patient at time of discharge  and instruct patient/caregiver as needed. Frequency may be adjusted depending on start of care date.    Notify MD if SBP > 160 or < 90; DBP > 90 or < 50; HR > 120 or < 50; Temp > 101    Medications: Review discharge medications with patient and family and provide education.      Current Discharge Medication List      START taking these medications    Details   furosemide (LASIX) 20 MG tablet Take 1 tablet (20 mg total) by mouth once daily.  Qty: 30 tablet, Refills: 11      lactulose (CHRONULAC) 20 gram/30 mL Soln Take 45 mLs (30 g total) by mouth 2 (two) times daily. for 10 days  Qty: 900 mL, Refills: 3      pantoprazole (PROTONIX) 40 MG tablet Take 1 tablet (40 mg total) by mouth 2 (two) times daily.  Qty: 60 tablet, Refills: 1    Comments: May substitute for Prilosec OTC 20 mg bid if patient unable to afford or requires prior authorization.      potassium chloride SA (K-DUR,KLOR-CON) 10 MEQ tablet Take 1 tablet (10 mEq total) by mouth once daily.  Qty: 30 tablet, Refills: 11      spironolactone (ALDACTONE) 100 MG tablet Take 1 tablet (100 mg total) by mouth once daily.  Qty: 30 tablet, Refills: 11         CONTINUE these medications which have NOT CHANGED    Details   alprazolam (XANAX) 0.25 MG tablet TAKE 1 TABLET BY MOUTH 3 TIMES A DAY AS NEEDED FOR ANXIETY  Refills: 0      atorvastatin (LIPITOR) 40 MG tablet Take 40 mg by mouth once daily.  Refills: 2      butalbital-acetaminophen-caffeine -40 mg (FIORICET, ESGIC) -40 mg per tablet Take 1 tablet by mouth once daily.  Refills: 0      carbidopa-levodopa  mg (SINEMET CR)  mg TbSR Take 2 tablets by mouth nightly.  Refills: 2      levetiracetam (KEPPRA) 250 MG Tab Take 500 mg by mouth 2 (two) times daily.   Refills: 2       losartan (COZAAR) 25 MG tablet Take 25 mg by mouth once daily.      metformin (GLUCOPHAGE) 1000 MG tablet Take 1,000 mg by mouth 2 (two) times daily with meals.        ADVAIR DISKUS 250-50 mcg/dose diskus inhaler USE 1 INHALATION BY MOUTH TWICE A DAY  Refills: 2      amitriptyline (ELAVIL) 25 MG tablet Take 25 mg by mouth every evening.  Refills: 2      EASY TOUCH TWIST LANCETS 33 gauge Misc USE TO CHECK BLOOD SUGAR DAILY  Refills: 2      LIRAGLUTIDE (VICTOZA 2-ANABEL SUBQ) Inject into the skin.      TRUETEST TEST STRIPS Strp USE TO CHECK BLOOD SUGAR DAILY  Refills: 2      VENTOLIN HFA 90 mcg/actuation inhaler Inhale 2 puffs into the lungs every 4 (four) hours as needed.  Refills: 1      ZOSTAVAX, PF, 19,400 unit/0.65 mL injection          STOP taking these medications       aspirin (ECOTRIN) 81 MG EC tablet Comments:   Reason for Stopping:         candesartan-hydrochlorothiazid (ATACAND HCT) 32-12.5 mg per tablet Comments:   Reason for Stopping:         hydrocodone-acetaminophen 10-325mg (NORCO)  mg Tab Comments:   Reason for Stopping:               I certify that this patient is confined to her home and needs intermittent skilled nursing care.

## 2019-06-02 NOTE — PROGRESS NOTES
Patient was coherent at beginning of shift and could carry on a conversation. Daughter warned of sundowning, stating it was worse early in the morning. Patient rested comfortably until about midnight, when she began using the call light frequently and trying to get out of bed. Whenever staff went in the room, the patient became hyper-verbal and it was apparent she was delusional. She complained of someone stealing her benefits check, her  getting someone else pregnant, going to the corner store to buy sinus medicine and xanax, someone dropping of children for her to take care of, going to half-way, calling the police, and mistaking the remote for a telephone and talking into it. Redirection does not last for more than a few seconds. When trying to reorient her, patient acted as though she did not hear what we were saying and continued with delusion. She remained in this state until shift change. She did not sleep for more than 1-2 hours early in the shift. Climbed out of bed and was in the doorway before [employer] responded.

## 2019-06-02 NOTE — DISCHARGE INSTRUCTIONS
Ascites    Ascites is fluid collecting in the abdomen (stomach area). Symptoms include swelling of the abdomen and a feeling of pressure. Shortness of breath may also occur. In severe cases, the feet, ankles and legs may also swell.   There are many causes of ascites. The most common are related to the liver. They include:  · Long-term alcohol abuse  · Hepatitis  · Diseases such as congestive heart failure, kidney failure, pancreatitis, or cancer  To treat the condition, a low-salt diet may be recommended. Medicines that help fluid leave the body (diuretics) may be prescribed. In some cases, a procedure is done to drain the abdomen of fluid. This is called paracentesis. Unless the underlying cause is treated, the fluid is likely to return.  If liver damage is due to alcohol, stopping all alcohol will help slow the progress of the disease. If liver damage is from hepatitis B or C, treatments may be given to fight the virus. If liver damage becomes life threatening, a liver transplant may be needed.  Home care  · Certain medicines can worsen liver damage. Talk to your healthcare provider or pharmacist about any medicines you currently take. Ask your healthcare provider or pharmacist before taking any new medicines. Also ask before taking herbs, vitamins, or minerals. Certain ones affect the liver.  · Do not taking acetaminophen or ibuprofen without taking to your healthcare provider first. Both can affect your liver.   · Stop all alcohol use. If you abuse alcohol, talk to your healthcare provider about getting help and support to stop.   · If you use IV drugs, seek help to stop. Never share needles or other equipment.    Follow-up care  Follow up with your healthcare provider as advised. If a culture was done, call as directed for the results. Depending on the results, your treatment may change.  The following sources can tell you more about ascites and help you find support.  · [employer]  527.888.4516 www.liverfoundation.org  · Hepatitis Foundation International www.hepfi.org  · Alcoholics Anonymous www.aa.org  · National [place] on Alcoholism and Drug Dependence 543-847-8239 www.ncadd.org  When to seek medical advice  Call your healthcare provider right away if you have any of the following:  · Sudden weight gain with increased size of your abdomen or leg swelling  · Increasing jaundice (yellowing of skin or eyes)  · Excess bleeding from cuts or injuries  · Blood in vomit or stool (black or red color)  · Trouble breathing  · Increasing abdominal pain  · Fever of 100.4ºF (38ºC) or higher, or as directed by your healthcare provider  Date Last Reviewed: 6/16/2015  © 1520-5142 [employer]. [address] [place] [address], [place] [address]. All rights reserved. This information is not intended as a substitute for professional medical care. Always follow your healthcare professional's instructions.

## 2019-06-02 NOTE — PLAN OF CARE
"   06/02/19 1068   Final Note   Assessment Type Final Discharge Note   Anticipated Discharge Disposition Home-[facility]   Hospital Follow Up  Appt(s) scheduled? Yes   Discharge plans and expectations educations in teach back method with documentation complete? Yes       EDUCATION:  Pt provided with educational information on " GI disorders ".  Information reviewed and placed in :My Healthcare Packet" to be brought home for pt to use as resource after discharge.  Information included:  signs and symptoms to look for and call the doctor if experiencing, and symptoms that may indicate a medical emergency: CALL 911.      All questions answered.  Teach back method used.  Pt  verbalized understanding of all information by stating, "  That he is aware of the signs and symptoms to watch for and when to contact MD and when to report to the ED immediately. Also SW took this time to ask pt to provide at least 3 symptoms.     MITCHELL informed Nurse Mederos pt was cleared from case management.   "

## 2019-06-03 NOTE — PT/OT/SLP DISCHARGE
Physical Therapy Discharge Summary    Name: Jeanie Malhotra  MRN: [id number]   Principal Problem: Acute upper GI bleeding     Patient Discharged from acute Physical Therapy on 19.  Please refer to prior PT notes for functional status.     Assessment:     Patient appropriate for care in another setting.    Objective:     GOALS:   Multidisciplinary Problems     Physical Therapy Goals     Not on file          Multidisciplinary Problems (Resolved)        Problem: Physical Therapy Goal    Goal Priority Disciplines Outcome Goal Variances Interventions   Physical Therapy Goal   (Resolved)     PT, PT/OT Outcome(s) achieved     Description:  Goals to be met by: 19     Patient will increase functional independence with mobility by performin. Supine to sit with Modified [place]  2. Rolling to Left and Right with Modified [place]  3. Sit to stand transfer with Modified [place] using RW  4. Bed to chair transfer with Modified [place] using Rolling Walker  5. Gait x200 feet with Modified [place] using Rolling Walker  6. Lower extremity exercise program 3 sets x10 reps per handout, with independence                      Reasons for Discontinuation of Therapy Services  Transfer to alternate level of care.      Plan:     Patient Discharged to: Home with Home Health Service.    Tricia Isaac, PT  6/3/2019

## 2019-06-04 PROCEDURE — G0180 MD CERTIFICATION HHA PATIENT: HCPCS | Mod: ,,, | Performed by: HOSPITALIST

## 2019-06-04 PROCEDURE — G0180 PR HOME HEALTH MD CERTIFICATION: ICD-10-PCS | Mod: ,,, | Performed by: HOSPITALIST

## 2019-07-08 ENCOUNTER — EXTERNAL HOME HEALTH (OUTPATIENT)
Dept: HOME HEALTH SERVICES | Facility: HOSPITAL | Age: 64
End: 2019-07-08
Payer: MEDICAID

## 2023-02-06 NOTE — PLAN OF CARE
Problem: Adult Inpatient Plan of Care  Goal: Plan of Care Review  Outcome: Ongoing (interventions implemented as appropriate)     05/31/19 1753   Plan of Care Review   Plan of Care Reviewed With patient;daughter;grandchild(madi);spouse       Problem: Diabetes Comorbidity  Goal: Blood Glucose Level Within Desired Range  Outcome: Ongoing (interventions implemented as appropriate)  Intervention: Maintain Glycemic Control     05/31/19 1753   Monitor and Manage Ketoacidosis   Glycemic Management blood glucose monitoring         Problem: Adjustment to Illness (Gastrointestinal Bleeding)  Goal: Optimal Coping with Acute Illness  Outcome: Ongoing (interventions implemented as appropriate)  Intervention: Optimize Psychosocial Response to Unexpected Illness     05/31/19 1753   Promote Anxiety Reduction   Supportive Measures active listening utilized;relaxation techniques promoted         Problem: Skin Injury Risk Increased  Goal: Skin Health and Integrity  Outcome: Ongoing (interventions implemented as appropriate)  Intervention: Optimize Skin Protection     05/31/19 1753   Prevent Additional Skin Injury   Head of Bed (HOB) HOB elevated   Pressure Reduction Devices pressure-redistributing mattress utilized   Pressure Reduction Techniques frequent weight shift encouraged   Monitor and Manage Hypervolemia   Skin Protection incontinence pads utilized         Problem: Infection  Goal: Infection Symptom Resolution  Outcome: Ongoing (interventions implemented as appropriate)  Intervention: Prevent or Manage Infection     05/31/19 1753   Prevent or Manage Infection   Fever Reduction/Comfort Measures lightweight bedding   Infection Management aseptic technique maintained            Undermining Type: Entire Wound

## 2023-03-29 NOTE — HOSPITAL COURSE
Ms. Malhotra was admitted with an UGIB with an initial H/H of 5.8/19.8. She was recently diagnosed with cirrhosis secondary to hepatitis C. Pt was empirically transfused 2 U PRBC and was started on PPI and octreotide gtt, and Rocephin for prophylaxis. Pulmonary performed paracentesis with removal of 1720 mL of ascitic fluid prior to patient undergoing EGD by GI on 5/28/19. EGD showed non-bleeding grade I esophageal varices. Two non-bleeding gastric ulcers with adherent clot. Injected. Due to the location of the ulcers seen only on complete retroflexion, cautery and hemoclip placement were unable to be performed. Portal hypertensive gastropathy. Normal examined duodenum. Patient returned to ICU for continued monitoring after procedure. H/H increased to 9.6/31.0 which was higher than expected likely reflecting previous level was higher than reported given persistently high levels that remained stable post transfusion. Ascitic fluid negative for SBP and consistent with portal HTN. Pt also with some cognitive impairment due to hepatic encephalopathy with ammonia level of 135  Lactulose started on 5/28. Patient is also on low Na diet along with lasix and spironolactone for her ascites. Improvement of Ammonia, but patient still with confusion.  Neurology consulted.  Unremarkable Head CT.  Symptoms consistent with acute delirium/sundowning.  Long discussion with family that symptoms should improve once patient returns back home.  Patient has remained afebrile and hemodynamically stable.  H/H has remained stable.  Patient will be discharged home with  and follow up with PCP and GI.   Bilobed Transposition Flap Text: The defect edges were debeveled with a #15 scalpel blade.  Given the location of the defect and the proximity to free margins a bilobed transposition flap was deemed most appropriate.  Using a sterile surgical marker, an appropriate bilobe flap drawn around the defect.    The area thus outlined was incised deep to adipose tissue with a #15 scalpel blade.  The skin margins were undermined to an appropriate distance in all directions utilizing iris scissors.

## 2024-02-23 ENCOUNTER — TELEPHONE (OUTPATIENT)
Dept: PHARMACY | Facility: CLINIC | Age: 69
End: 2024-02-23
Payer: MEDICAID